# Patient Record
Sex: FEMALE | Race: BLACK OR AFRICAN AMERICAN | URBAN - METROPOLITAN AREA
[De-identification: names, ages, dates, MRNs, and addresses within clinical notes are randomized per-mention and may not be internally consistent; named-entity substitution may affect disease eponyms.]

---

## 2017-05-08 ENCOUNTER — HOSPITAL ENCOUNTER (OUTPATIENT)
Dept: MAMMOGRAPHY | Facility: CLINIC | Age: 42
Discharge: HOME | End: 2017-05-08
Attending: OBSTETRICS & GYNECOLOGY | Admitting: OBSTETRICS & GYNECOLOGY
Payer: COMMERCIAL

## 2017-05-08 DIAGNOSIS — Z12.31 VISIT FOR SCREENING MAMMOGRAM: ICD-10-CM

## 2017-05-08 PROCEDURE — G0202 SCR MAMMO BI INCL CAD: HCPCS

## 2020-11-17 ENCOUNTER — TRANSFERRED RECORDS (OUTPATIENT)
Dept: HEALTH INFORMATION MANAGEMENT | Facility: CLINIC | Age: 45
End: 2020-11-17

## 2020-12-23 ENCOUNTER — TRANSFERRED RECORDS (OUTPATIENT)
Dept: HEALTH INFORMATION MANAGEMENT | Facility: CLINIC | Age: 45
End: 2020-12-23

## 2020-12-29 ENCOUNTER — MEDICAL CORRESPONDENCE (OUTPATIENT)
Dept: HEALTH INFORMATION MANAGEMENT | Facility: CLINIC | Age: 45
End: 2020-12-29

## 2020-12-29 ENCOUNTER — TRANSCRIBE ORDERS (OUTPATIENT)
Dept: OTHER | Age: 45
End: 2020-12-29

## 2020-12-29 DIAGNOSIS — E11.8 TYPE 2 DIABETES MELLITUS WITH COMPLICATION, WITH LONG-TERM CURRENT USE OF INSULIN (H): ICD-10-CM

## 2020-12-29 DIAGNOSIS — Z79.4 TYPE 2 DIABETES MELLITUS WITH COMPLICATION, WITH LONG-TERM CURRENT USE OF INSULIN (H): ICD-10-CM

## 2020-12-29 DIAGNOSIS — A17.0 TB MENINGITIS: ICD-10-CM

## 2020-12-29 DIAGNOSIS — H57.13 EYE PAIN, BILATERAL: Primary | ICD-10-CM

## 2021-01-06 ENCOUNTER — TRANSFERRED RECORDS (OUTPATIENT)
Dept: HEALTH INFORMATION MANAGEMENT | Facility: CLINIC | Age: 46
End: 2021-01-06

## 2021-04-21 ENCOUNTER — TRANSCRIBE ORDERS (OUTPATIENT)
Dept: OTHER | Age: 46
End: 2021-04-21

## 2021-04-21 DIAGNOSIS — N13.30 HYDRONEPHROSIS, UNSPECIFIED HYDRONEPHROSIS TYPE: Primary | ICD-10-CM

## 2021-04-26 NOTE — TELEPHONE ENCOUNTER
MEDICAL RECORDS REQUEST   [facility]  Urology Clinic  [address] [place] [address]  PHONE: [phone]  Fax: [phone]        FUTURE VISIT INFORMATION                                                   Manuela Osman, : 1975 scheduled for future visit at [facility]    APPOINTMENT INFORMATION:    Date: 21    Provider:  Ankur    Reason for Visit/Diagnosis: hydronephrosis    REFERRAL INFORMATION:    Referring provider:  Veronica Gonzales    Specialty:     Referring providers clinic:  [facility]    Clinic contact number:  [phone]    RECORDS REQUESTED FOR VISIT                                                     NOTES  STATUS/DETAILS   OFFICE NOTE from referring provider  yes   OFFICE NOTE from other specialist  no   DISCHARGE SUMMARY from hospital  no   DISCHARGE REPORT from the ER  no   OPERATIVE REPORT  no   MEDICATION LIST  yes   LABS     URINALYSIS (UA)  yes   URINE CYTOLOGY  no

## 2021-05-13 ENCOUNTER — PRE VISIT (OUTPATIENT)
Dept: UROLOGY | Facility: CLINIC | Age: 46
End: 2021-05-13

## 2021-05-13 NOTE — TELEPHONE ENCOUNTER
Hydronephrosis consult.  Outside images requested.  All other records available in [facility] and Care Everywhere.

## 2021-05-14 ENCOUNTER — VIRTUAL VISIT (OUTPATIENT)
Dept: UROLOGY | Facility: CLINIC | Age: 46
End: 2021-05-14
Payer: COMMERCIAL

## 2021-05-14 ENCOUNTER — PRE VISIT (OUTPATIENT)
Dept: UROLOGY | Facility: CLINIC | Age: 46
End: 2021-05-14

## 2021-05-14 DIAGNOSIS — N13.30 HYDRONEPHROSIS OF LEFT KIDNEY: Primary | ICD-10-CM

## 2021-05-14 DIAGNOSIS — Z87.898 HISTORY OF URINARY RETENTION: ICD-10-CM

## 2021-05-14 PROCEDURE — 99203 OFFICE O/P NEW LOW 30 MIN: CPT | Mod: 95 | Performed by: NURSE PRACTITIONER

## 2021-05-14 NOTE — PATIENT INSTRUCTIONS
UROLOGY CLINIC VISIT PATIENT INSTRUCTIONS    -We will pursue a Lasix renogram scan to evaluate for any evidence of obstruction near your left kidney.   -Please follow up for a nurse visit to complete a urinary flow study and post-void residual scan on the same day.  -Follow up with Dr. Bae to review results of the renogram.     If you have any issues, questions or concerns in the meantime, do not hesitate to contact us at [phone] or via HistoryFile.     It was a pleasure meeting with you today.  Thank you for allowing me and my team the privilege of caring for you today.  YOU are the reason we are here, and I truly hope we provided you with the excellent service you deserve.  Please let us know if there is anything else we can do for you so that we can be sure you are leaving completely satisfied with your care experience.    Kayleen Pascual, CNP

## 2021-05-14 NOTE — LETTER
5/14/2021       RE: Manuela Osman  [address]  [facility] [address]     Dear Colleague,    Thank you for referring your patient, Manuela Osman, to the [facility] UROLOGY CLINIC [place] at [facility]. Please see a copy of my visit note below.    Video Visit Technology for this patient: Patient in video visit waiting room.    Manuela is a 46 year old who is being evaluated via a billable video visit.      How would you like to obtain your AVS? Mail a copy  If the video visit is dropped, the invitation should be resent by: Text to cell phone: [phone]  Will anyone else be joining your video visit? No    Video Start Time: 12:38 PM  Video-Visit Details    Type of service:  Video Visit    Video End Time: 1:06 PM    Originating Location (pt. Location): Home    Distant Location (provider location):  [facility] UROLOGY [facility]     Platform used for Video Visit: Teo      Manuela Osman complains of   Chief Complaint   Patient presents with     Consult     hydronephrosis       Assessment/Plan:  46 year old female with a history of ureteral stricture, s/p brief stenting in 6413-6559, now with imaging continuing to show hydronephrosis on her left side, which is not surprising. Her renal function has been noted to be severely reduced on the left side, down to ~10% on her last renogram a few years ago. However, she c/o left flank pain that has been worsening. As it has been a few years since her last set of imaging, will update a renogram to get a new baseline. Will refer to Dr. Bae for review of this imaging and determination of next steps / appropriateness for intervention, given her pain.    Regarding her more recent history of urinary retention requiring Webb, with lack of urologic follow up, recommend she come in for a nurse visit in the clinic to complete a uroflow/PVR to ensure that she is emptying her bladder well, as retention  "could worsen her hydro and flank pain.    Kayleen Pascual, CNP  Department of Urology      Subjective:   46 year old female with a history of DM2, HLD, ureteral stricture and TB meningitis who presents for evaluation of hydronephrosis. History is obtained from the patient with the aid of a professional [facility]  and supplemented by chart review. Patient is somewhat of a difficult historian.    She has a history of left-sided hydronephrosis found on ultrasound in 2017, and was referred to outside urologist. Records from her [place] [facility] visits not available today for personal review--will request. She then had a stent placed in 10/2017 for ureteral stricture. Unfortunately, she developed left pyelonephritis and urosepsis with this stent in place, and was hospitalized for IV abx and stent exchange. Renogram was then done in 09/2018, which appeared to show severely decreased function on the left with now blood blow and excretion, and split function showing 90% function on the right. Therefore, no further stenting or workup was planned by her urologist.     More recently, an ultrasound was obtained 5/7/21 again showing left-sided hydronepephrosis with cortical thinning. Today, she acknowledges some left flank/upper back pain. This has been present for \"some time now\" and seems to be getting worse.     Of note, she was also hospitalized in 11/202 with COVID, with fever and a stiff neck. 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 During this hospitalization, she had DM and urinary retention, which required a Webb catheter. Failed multiple voiding trials at rehab facility, and ended up going home with the catheter under care of her family. This was eventually removed a few months later, but she does not know who took it out and has had no urology follow up. She feels like she has been voiding fine.     Objective:     Exam:   Patient is a 46 year old female   General Appearance: Well groomed, " hygenic  Respiratory: No cough, no respiratory distress or labored breathing  Musculoskeletal: Grossly normal with no gross deficits  Skin: No discoloration or apparent rashes  Neurologic: No tremors  Psychiatric: Alert and oriented  Further examination is deferred due to the nature of our visit.

## 2021-05-14 NOTE — NURSING NOTE
"Chief Complaint   Patient presents with     Consult     hydronephrosis       Allergies   Allergen Reactions     Lisinopril Cough and Itching     Other reaction(s): Other (see comments)     Loratadine Itching     Other reaction(s): Other (see comments), Runny Nose     No Clinical Screening - See Comments      Other reaction(s): Unknown  \"allergy to something given during surgery for \"      Perfume Other (See Comments)     Sneezing and congestion  Other reaction(s): Other - Describe In Comment Field  Sneezing and congestion         Current Outpatient Medications   Medication Sig Dispense Refill     UNKNOWN TO PATIENT Patient does not know any of the medications she is taking.         Social History     Tobacco Use     Smoking status: Never Smoker     Smokeless tobacco: Never Used   Substance Use Topics     Alcohol use: None     Drug use: None       Holly Andrade LPN  2021  12:16 PM  "

## 2021-05-14 NOTE — PROGRESS NOTES
Video Visit Technology for this patient: Patient in video visit waiting room.    Manuela is a 46 year old who is being evaluated via a billable video visit.      How would you like to obtain your AVS? Mail a copy  If the video visit is dropped, the invitation should be resent by: Text to cell phone: [phone]  Will anyone else be joining your video visit? No    Video Start Time: 12:38 PM  Video-Visit Details    Type of service:  Video Visit    Video End Time: 1:06 PM    Originating Location (pt. Location): Home    Distant Location (provider location):  [facility] UROLOGY CLINIC [place]     Platform used for Video Visit: Teo      Manuela Osman complains of   Chief Complaint   Patient presents with     Consult     hydronephrosis       Assessment/Plan:  46 year old female with a history of ureteral stricture, s/p brief stenting in 5985-1080, now with imaging continuing to show hydronephrosis on her left side, which is not surprising. Her renal function has been noted to be severely reduced on the left side, down to ~10% on her last renogram a few years ago. However, she c/o left flank pain that has been worsening. As it has been a few years since her last set of imaging, will update a renogram to get a new baseline. Will refer to Dr. Bae for review of this imaging and determination of next steps / appropriateness for intervention, given her pain.    Regarding her more recent history of urinary retention requiring Webb, with lack of urologic follow up, recommend she come in for a nurse visit in the clinic to complete a uroflow/PVR to ensure that she is emptying her bladder well, as retention could worsen her hydro and flank pain.    Kayleen Pascual, CNP  Department of Urology      Subjective:   46 year old female with a history of DM2, HLD, ureteral stricture and TB meningitis who presents for evaluation of hydronephrosis. History is obtained from the patient with the aid of a professional [facility]  " and supplemented by chart review. Patient is somewhat of a difficult historian.    She has a history of left-sided hydronephrosis found on ultrasound in 2017, and was referred to outside urologist. Records from her [place] [facility] visits not available today for personal review--will request. She then had a stent placed in 10/2017 for ureteral stricture. Unfortunately, she developed left pyelonephritis and urosepsis with this stent in place, and was hospitalized for IV abx and stent exchange. Renogram was then done in 09/2018, which appeared to show severely decreased function on the left with now blood blow and excretion, and split function showing 90% function on the right. Therefore, no further stenting or workup was planned by her urologist.     More recently, an ultrasound was obtained 5/7/21 again showing left-sided hydronepephrosis with cortical thinning. Today, she acknowledges some left flank/upper back pain. This has been present for \"some time now\" and seems to be getting worse.     Of note, she was also hospitalized in 11/202 with COVID, with fever and a stiff neck. 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 During this hospitalization, she had DM and urinary retention, which required a Webb catheter. Failed multiple voiding trials at rehab facility, and ended up going home with the catheter under care of her family. This was eventually removed a few months later, but she does not know who took it out and has had no urology follow up. She feels like she has been voiding fine.       Objective:     Exam:   Patient is a 46 year old female   General Appearance: Well groomed, hygenic  Respiratory: No cough, no respiratory distress or labored breathing  Musculoskeletal: Grossly normal with no gross deficits  Skin: No discoloration or apparent rashes  Neurologic: No tremors  Psychiatric: Alert and oriented  Further examination is deferred due to the nature of our visit.               "

## 2021-05-17 ENCOUNTER — TELEPHONE (OUTPATIENT)
Dept: UROLOGY | Facility: CLINIC | Age: 46
End: 2021-05-17

## 2021-05-17 NOTE — TELEPHONE ENCOUNTER
Checkout note from 5/14 w/ Kayleen Pascual    -We will pursue a Lasix renogram scan to evaluate for any evidence of obstruction near your left kidney.   -Please follow up for a nurse visit to complete a urinary flow study and post-void residual scan on the same day.  -Follow up with Dr. Bae to review results of the renogram.

## 2021-05-27 ENCOUNTER — APPOINTMENT (OUTPATIENT)
Dept: INTERPRETER SERVICES | Facility: CLINIC | Age: 46
End: 2021-05-27
Payer: COMMERCIAL

## 2021-06-04 ENCOUNTER — PRE VISIT (OUTPATIENT)
Dept: UROLOGY | Facility: CLINIC | Age: 46
End: 2021-06-04

## 2021-06-04 NOTE — TELEPHONE ENCOUNTER
Reason for visit: follow up     Relevant information: hx of hydronephrosis of left kidney    Records/imaging/labs/orders: in [facility]; scheduled for renogram 5/14/21    Pt called: no    At Rooming: normal

## 2022-07-18 ENCOUNTER — TRANSCRIBE ORDERS (OUTPATIENT)
Dept: OTHER | Age: 47
End: 2022-07-18

## 2022-07-18 DIAGNOSIS — R20.2 NUMBNESS AND TINGLING OF BOTH FEET: Primary | ICD-10-CM

## 2022-07-18 DIAGNOSIS — R20.0 NUMBNESS AND TINGLING OF BOTH FEET: Primary | ICD-10-CM

## 2022-08-31 ENCOUNTER — MEDICAL CORRESPONDENCE (OUTPATIENT)
Dept: HEALTH INFORMATION MANAGEMENT | Facility: CLINIC | Age: 47
End: 2022-08-31

## 2022-09-01 NOTE — TELEPHONE ENCOUNTER
Action 8/31/22 MV 7.40pm   Action Taken Imaging request faxed to [facility]     12/19/2022-Request for images faxed to [facility]-MR @ 814am     12/20/2022-[facility] Images now in PACS-MR @ 512am    FUTURE VISIT INFORMATION      FUTURE VISIT INFORMATION:    Date: 12/22/22    Time: 2.15pm    Location: [facility]  REFERRAL INFORMATION:    Referring provider:  Nay WILLOUGHBY    Referring providers clinic:  [facility]    Reason for visit/diagnosis  Numbness & tingling in feet    RECORDS REQUESTED FROM:       Clinic name Comments Records Status Imaging Status   [facility] Nay Odom PA:  8/31/22 Care Everywhere No Images-MR    [facility] Dr Lewis Fernandez:  3/5/21 Care Everywhere No Images-MR    [facility] ED 12/16/20-12/21/20    Imaging:  MRI Lumbar Spine 6/23/21  MRI Thoracic Spine 6/23/21  MRI Lumbar Spine 3/19/21  MRI Cervical Spine 11/8/20  MRI Thoracic Spine 11/8/20  MRI Head 11/8/20  CT Head 11/6/20    Lumbar Puncture 11/6/20 Care Everywhere Requested-MR

## 2022-09-02 ENCOUNTER — TRANSCRIBE ORDERS (OUTPATIENT)
Dept: OTHER | Age: 47
End: 2022-09-02

## 2022-09-02 DIAGNOSIS — Z79.4 TYPE 2 DIABETES MELLITUS WITH DIABETIC POLYNEUROPATHY, WITH LONG-TERM CURRENT USE OF INSULIN (H): ICD-10-CM

## 2022-09-02 DIAGNOSIS — Z86.11: ICD-10-CM

## 2022-09-02 DIAGNOSIS — M54.41 BILATERAL LOW BACK PAIN WITH BILATERAL SCIATICA, UNSPECIFIED CHRONICITY: ICD-10-CM

## 2022-09-02 DIAGNOSIS — R29.898 WEAKNESS OF BOTH LOWER EXTREMITIES: Primary | ICD-10-CM

## 2022-09-02 DIAGNOSIS — E11.42 TYPE 2 DIABETES MELLITUS WITH DIABETIC POLYNEUROPATHY, WITH LONG-TERM CURRENT USE OF INSULIN (H): ICD-10-CM

## 2022-09-02 DIAGNOSIS — R52 GENERALIZED BODY ACHES: ICD-10-CM

## 2022-09-02 DIAGNOSIS — M54.42 BILATERAL LOW BACK PAIN WITH BILATERAL SCIATICA, UNSPECIFIED CHRONICITY: ICD-10-CM

## 2022-12-22 ENCOUNTER — VIRTUAL VISIT (OUTPATIENT)
Dept: NEUROLOGY | Facility: CLINIC | Age: 47
End: 2022-12-22
Attending: PHYSICIAN ASSISTANT
Payer: COMMERCIAL

## 2022-12-22 ENCOUNTER — PRE VISIT (OUTPATIENT)
Dept: NEUROLOGY | Facility: CLINIC | Age: 47
End: 2022-12-22

## 2022-12-22 DIAGNOSIS — G62.9 NEUROPATHY: Primary | ICD-10-CM

## 2022-12-22 PROCEDURE — 99207 PR NO CHARGE LOS: CPT | Performed by: STUDENT IN AN ORGANIZED HEALTH CARE EDUCATION/TRAINING PROGRAM

## 2022-12-22 RX ORDER — METFORMIN HCL 500 MG
500 TABLET, EXTENDED RELEASE 24 HR ORAL
COMMUNITY
Start: 2022-12-12

## 2022-12-22 RX ORDER — FLUTICASONE PROPIONATE 50 MCG
1 SPRAY, SUSPENSION (ML) NASAL PRN
COMMUNITY
Start: 2022-12-07

## 2022-12-22 RX ORDER — ATORVASTATIN CALCIUM 20 MG/1
40 TABLET, FILM COATED ORAL EVERY MORNING
COMMUNITY
Start: 2022-11-26

## 2022-12-22 RX ORDER — FLUCONAZOLE 150 MG/1
TABLET ORAL
COMMUNITY
Start: 2022-08-31 | End: 2024-08-21

## 2022-12-22 RX ORDER — INSULIN DEGLUDEC 100 U/ML
40 INJECTION, SOLUTION SUBCUTANEOUS
COMMUNITY
Start: 2022-12-19

## 2022-12-22 RX ORDER — FAMOTIDINE 20 MG/1
20 TABLET, FILM COATED ORAL
COMMUNITY
Start: 2022-12-06

## 2022-12-22 RX ORDER — ENOXAPARIN SODIUM 100 MG/ML
INJECTION SUBCUTANEOUS
COMMUNITY
End: 2024-08-21

## 2022-12-22 RX ORDER — CALCIUM CARBONATE/VITAMIN D3 500 MG-10
1 TABLET ORAL EVERY MORNING
COMMUNITY

## 2022-12-22 RX ORDER — LOSARTAN POTASSIUM 25 MG/1
25 TABLET ORAL AT BEDTIME
COMMUNITY
Start: 2022-11-21

## 2022-12-22 RX ORDER — BENZONATATE 100 MG/1
CAPSULE ORAL
COMMUNITY
Start: 2022-05-12 | End: 2024-08-21

## 2022-12-22 RX ORDER — GABAPENTIN 300 MG/1
400 CAPSULE ORAL 3 TIMES DAILY
COMMUNITY
Start: 2022-10-03

## 2022-12-22 RX ORDER — ACETAMINOPHEN 500 MG/1
TABLET, COATED ORAL
COMMUNITY
Start: 2022-11-21

## 2022-12-22 RX ORDER — DAPAGLIFLOZIN 10 MG/1
10 TABLET, FILM COATED ORAL AT BEDTIME
COMMUNITY
Start: 2022-12-21 | End: 2024-08-21

## 2022-12-22 RX ORDER — INSULIN ASPART 100 [IU]/ML
8 INJECTION, SOLUTION INTRAVENOUS; SUBCUTANEOUS 2 TIMES DAILY WITH MEALS
COMMUNITY
Start: 2022-12-19

## 2022-12-22 RX ORDER — DIPHENHYDRAMINE HCL 25 MG
CAPSULE ORAL
COMMUNITY
Start: 2022-12-06 | End: 2024-08-21

## 2022-12-22 NOTE — LETTER
2022       RE: Manuela Osman  [address]  [facility] [address]     Dear Colleague,    Thank you for referring your patient, Manuela Osman, to the [facility] NEUROLOGY CLINIC [place] at [facility]. Please see a copy of my visit note below.    DEPARTMENT OF NEUROLOGY  Referral for: numbness and tingling  Patient Name:  Manuela Osman  MRN:  [id number]    :  1975  Date of Clinic Visit:  2022  Primary Care Provider:  [facility]  Referring Provider: self    ASSESSMENT AND PLAN:  Manuela Osman is a 47 year old female with PMH significant for HTN, T2DM, TB meningitis in 2020 who presents to the neurology clinic for evaluation of bilateral lower extremity numbness, tingling, and pain. Has had MRI lumbar and thoracic spine x 2 and EMG prior to visit. Unfortunately very limited neurologic exam obtained due to virtual visit, and in this patient would be very helpful. There are components to history that sound suspicion for a lumbar radiculopathy, but also a peripheral neuropathy. A lumbar radiculopathy is supported by low back pain and symptoms traveling down buttock and lower extremities, however symptoms largely unchanged from lumbar MRI completed in 2021 without evidence of nerve impingement at that time. Also reports of intermittent sensation of symptoms starting at toes and moving upward to knees, with numbness and tingling. This in association with most recent Hgb A1c of 9.1 suggest possible component of a peripheral neuropathy. Again, a physical exam would be helpful in further evaluating this. EMG would also be beneficial in further localizing symptoms. Patient had one completed in 2021 through [facility], but results unavailable to me at this time. Will recommend repeating this study, as well as MRI spine to evaluate etiology of symptoms.     Plan:  - NCS/EMG   - MRI cervical, thoracic and lumbar spine  with and without contrast (contrast to evaluate for enhancement)  - Patient to reach out with current gabapentin dose and can make adjustments from there if indicated  - Follow up after the above is completed (in person)    Patient was discussed with Dr. Hidalgo.    Leena Rosas MD  Neurology Resident, PGY-3  [facility] Department of Neurology    Outpatient General Neurology Staff  I have discussed the patient with the neurology resident on 12-21-22.  The patient declined to stay on the video call after resident completed her initial evaluation.      We will ask that she follow up in person.    Miguel Hidalgo MD      ___________________________________________    CHIEF COMPLAINT: numbness in bilateral lower extremities    HISTORY OF PRESENT ILLNESS:  Manuela Osman is a  47 year old female presenting with PMH signficant for HTN, T2DM, HLD, ureteral stricture, vitamin D deficiency, TB meningitis who is presenting to the neurology clinic as a referral for numbness and tingling in feet.    Visit conducted via video with  merged onto call.    In November of 2021 patient first noticed symptoms after she was in the hospital for meningitis (per chart review actually appears to be November 2020 was admitted for TB meningitis). She notes she was in the hospital for 3 months due to this illness and it was during this hospitalization that she developed symptoms. Symptoms are typically numbness and subjective swelling in feet. Also reports of significant pain in lower back described as a sharp pain with numbness and tingling. Does not think that the pain radiates down from her back into legs but entire area is affected. When she is going from a seated to a standing position will have to use assistive device to get up due to reported pain and weakness. She did physical therapy for the past 6 months without improvement, currently going 2 days a week. Also reports taking gabapentin 1 tablet a day (unsure  dose of tablet) but does not think it is helping either. It was helpful initially but no longer providing much benefit.     No bowel or bladder incontinence. Constipation for which she takes bowel medications. Urinary retention secondary to ureteral stricture that has required gar catheter in the past. No changes in vision or hearing. No dizziness, vertigo.     She does also think that there is some weakness in her hands, particularly when she is trying to open bottles she usually needs to ask for help. No numbness, tingling, or other sensory changes in upper extremities.     She reports having several MRIs and EMG for symptoms. They have been relatively stable without much increase in symptoms since these studies were completed with most recent studies in June of 2021. 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 She did have an EMG at [facility] in Summer of 2021 as well but cannot see records for this.She reports she was not told about results of study.      PHYSICAL EXAMINATION:  Vitals: virtual visit     General: no acute distress    Neurologic Exam:  Mental status: Patient is oriented to person, place and time and able to provide a detailed account of history of illness. Attention and fund of knowledge are normal. Speech is fluent.    Cranial nerves: Difficult to evaluate pupils. Visual fields are full with finger counting on screen. Extraocular movements are normal. Facial movements are symmetric. Hearing is normal to conversation. Palate rises symmetrically and there is no dysarthria. Tongue protrusion is normal.    Motor/Strength: Lifts arms anti-gravity without difficulty. Stands from a seated position without assistance of upper extremities.    Sensation: reported to be decreased to light touch in feet up to low back.     Reflexes: deferred    Coordination: Finger-to-nose symmetric  "and normal bilaterally. Rapid alternating movements are symmetric in the extremities.     Gait: stable      INVESTIGATIONS:   MRI lumbar spine w/wo contrast 21  Impression:   1. No significant change compared to MRI dated 2021.   2. At L5-S1 there is a central protrusion that indents the thecal sac without significant spinal canal stenosis. Moderate facet arthrosis.   3. At L4-5 there is mild disc and facet degeneration resulting in mild spinal canal narrowing and mild narrowing of the neural foramina inferior recesses.   4. At L3-4 there is a mild disc bulge with mild narrowing of the neural foramina inferior recesses.   5. No pathologic enhancement. No evidence of discitis or osteomyelitis.   6. Stable left hydronephrosis without hydroureter    MRI thoracic spine w/wo contrast 21  IMPRESSION:   1. Mild dextrocurvature of the thoracic spine. No acute osseous abnormality.   2. No significant spinal canal stenosis or neural foraminal narrowing.   3. Normal thoracic spinal cord. Complete resolution of prior leptomeningeal enhancement.   4. Similar left hydronephrosis without hydroureter.       REVIEW OF SYSTEMS: 12-point RoS negative except as per HPI.    ALLERGIES:  Allergies   Allergen Reactions     Lisinopril Cough and Itching     Other reaction(s): Other (see comments)     Loratadine Itching     Other reaction(s): Other (see comments), Runny Nose     No Clinical Screening - See Comments      Other reaction(s): Unknown  \"allergy to something given during surgery for \"      Perfume Other (See Comments)     Sneezing and congestion  Other reaction(s): Other - Describe In Comment Field  Sneezing and congestion       MEDICATIONS:  Current Outpatient Medications   Medication Sig Dispense Refill     UNKNOWN TO PATIENT Patient does not know any of the medications she is taking.       PAST MEDICAL HISTORY:  No past medical history on file.     PAST SURGICAL HISTORY:  Past Surgical History: "   Procedure Laterality Date      SECTION       SHOULDER SURGERY       SOCIAL HISTORY:  Social History     Socioeconomic History     Marital status:    Tobacco Use     Smoking status: Never     Smokeless tobacco: Never   Other Topics Concern     Parent/sibling w/ CABG, MI or angioplasty before 65F 55M? Not Asked   Social History Narrative     Not on file       FAMILY HISTORY:  No family history on file.          Sincerely,    Leena Rosas MD

## 2022-12-22 NOTE — PROGRESS NOTES
Manuela is a 47 year old who is being evaluated via a billable video visit.      How would you like to obtain your AVS? Mail a copy  If the video visit is dropped, the invitation should be resent by: Text to cell phone: [phone]  Will anyone else be joining your video visit? No        Video-Visit Details    Type of service:  Video Visit   Video Start Time: 2:10 pm  Video End Time:2:45 pm    Originating Location (pt. Location): Home    Distant Location (provider location):  On-site  Platform used for Video Visit: SeymourWell

## 2022-12-22 NOTE — PROGRESS NOTES
DEPARTMENT OF NEUROLOGY  Referral for: numbness and tingling  Patient Name:  Manuela Osman  MRN:  [id number]    :  1975  Date of Clinic Visit:  2022  Primary Care Provider:  [facility]  Referring Provider: self    ASSESSMENT AND PLAN:  Manuela Osman is a 47 year old female with PMH significant for HTN, T2DM, TB meningitis in 2020 who presents to the neurology clinic for evaluation of bilateral lower extremity numbness, tingling, and pain. Has had MRI lumbar and thoracic spine x 2 and EMG prior to visit. Unfortunately very limited neurologic exam obtained due to virtual visit, and in this patient would be very helpful. There are components to history that sound suspicion for a lumbar radiculopathy, but also a peripheral neuropathy. A lumbar radiculopathy is supported by low back pain and symptoms traveling down buttock and lower extremities, however symptoms largely unchanged from lumbar MRI completed in 2021 without evidence of nerve impingement at that time. Also reports of intermittent sensation of symptoms starting at toes and moving upward to knees, with numbness and tingling. This in association with most recent Hgb A1c of 9.1 suggest possible component of a peripheral neuropathy. Again, a physical exam would be helpful in further evaluating this. EMG would also be beneficial in further localizing symptoms. Patient had one completed in 2021 through [facility], but results unavailable to me at this time. Will recommend repeating this study, as well as MRI spine to evaluate etiology of symptoms.     Plan:  - NCS/EMG   - MRI cervical, thoracic and lumbar spine with and without contrast (contrast to evaluate for enhancement)  - Patient to reach out with current gabapentin dose and can make adjustments from there if indicated  - Follow up after the above is completed (in person)    Patient was discussed with Dr. Hidalgo.    Leena Rosas MD  Neurology Resident,  PGY-3  [facility] Department of Neurology    Outpatient General Neurology Staff  I have discussed the patient with the neurology resident on 12-21-22.  The patient declined to stay on the video call after resident completed her initial evaluation.      We will ask that she follow up in person.    Miguel Hidalgo MD      ___________________________________________    CHIEF COMPLAINT: numbness in bilateral lower extremities    HISTORY OF PRESENT ILLNESS:  Manuela Osman is a  47 year old female presenting with [facility] signficant for HTN, T2DM, HLD, ureteral stricture, vitamin D deficiency, TB meningitis who is presenting to the neurology clinic as a referral for numbness and tingling in feet.    Visit conducted via video with  merged onto call.    In November of 2021 patient first noticed symptoms after she was in the hospital for meningitis (per chart review actually appears to be November 2020 was admitted for TB meningitis). She notes she was in the hospital for 3 months due to this illness and it was during this hospitalization that she developed symptoms. Symptoms are typically numbness and subjective swelling in feet. Also reports of significant pain in lower back described as a sharp pain with numbness and tingling. Does not think that the pain radiates down from her back into legs but entire area is affected. When she is going from a seated to a standing position will have to use assistive device to get up due to reported pain and weakness. She did physical therapy for the past 6 months without improvement, currently going 2 days a week. Also reports taking gabapentin 1 tablet a day (unsure dose of tablet) but does not think it is helping either. It was helpful initially but no longer providing much benefit.     No bowel or bladder incontinence. Constipation for which she takes bowel medications. Urinary retention secondary to ureteral stricture that has required gar catheter in the past. No  changes in vision or hearing. No dizziness, vertigo.     She does also think that there is some weakness in her hands, particularly when she is trying to open bottles she usually needs to ask for help. No numbness, tingling, or other sensory changes in upper extremities.     She reports having several MRIs and EMG for symptoms. They have been relatively stable without much increase in symptoms since these studies were completed with most recent studies in June of 2021. 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 She did have an EMG at [facility] in Summer of 2021 as well but cannot see records for this.She reports she was not told about results of study.      PHYSICAL EXAMINATION:  Vitals: virtual visit     General: no acute distress    Neurologic Exam:  Mental status: Patient is oriented to person, place and time and able to provide a detailed account of history of illness. Attention and fund of knowledge are normal. Speech is fluent.    Cranial nerves: Difficult to evaluate pupils. Visual fields are full with finger counting on screen. Extraocular movements are normal. Facial movements are symmetric. Hearing is normal to conversation. Palate rises symmetrically and there is no dysarthria. Tongue protrusion is normal.    Motor/Strength: Lifts arms anti-gravity without difficulty. Stands from a seated position without assistance of upper extremities.    Sensation: reported to be decreased to light touch in feet up to low back.     Reflexes: deferred    Coordination: Finger-to-nose symmetric and normal bilaterally. Rapid alternating movements are symmetric in the extremities.     Gait: stable      INVESTIGATIONS:   MRI lumbar spine w/wo contrast 6/23/21  Impression:   1. No significant change compared to MRI dated 03/19/2021.   2. At L5-S1 there is a central protrusion that indents the thecal  "sac without significant spinal canal stenosis. Moderate facet arthrosis.   3. At L4-5 there is mild disc and facet degeneration resulting in mild spinal canal narrowing and mild narrowing of the neural foramina inferior recesses.   4. At L3-4 there is a mild disc bulge with mild narrowing of the neural foramina inferior recesses.   5. No pathologic enhancement. No evidence of discitis or osteomyelitis.   6. Stable left hydronephrosis without hydroureter    MRI thoracic spine w/wo contrast 21  IMPRESSION:   1. Mild dextrocurvature of the thoracic spine. No acute osseous abnormality.   2. No significant spinal canal stenosis or neural foraminal narrowing.   3. Normal thoracic spinal cord. Complete resolution of prior leptomeningeal enhancement.   4. Similar left hydronephrosis without hydroureter.       REVIEW OF SYSTEMS: 12-point RoS negative except as per HPI.    ALLERGIES:  Allergies   Allergen Reactions     Lisinopril Cough and Itching     Other reaction(s): Other (see comments)     Loratadine Itching     Other reaction(s): Other (see comments), Runny Nose     No Clinical Screening - See Comments      Other reaction(s): Unknown  \"allergy to something given during surgery for \"      Perfume Other (See Comments)     Sneezing and congestion  Other reaction(s): Other - Describe In Comment Field  Sneezing and congestion       MEDICATIONS:  Current Outpatient Medications   Medication Sig Dispense Refill     UNKNOWN TO PATIENT Patient does not know any of the medications she is taking.       PAST MEDICAL HISTORY:  No past medical history on file.     PAST SURGICAL HISTORY:  Past Surgical History:   Procedure Laterality Date      SECTION       SHOULDER SURGERY       SOCIAL HISTORY:  Social History     Socioeconomic History     Marital status:    Tobacco Use     Smoking status: Never     Smokeless tobacco: Never   Other Topics Concern     Parent/sibling w/ CABG, MI or angioplasty before 65F " 55M? Not Asked   Social History Narrative     Not on file       FAMILY HISTORY:  No family history on file.

## 2022-12-29 ENCOUNTER — OFFICE VISIT (OUTPATIENT)
Dept: PHYSICAL MEDICINE AND REHAB | Facility: CLINIC | Age: 47
End: 2022-12-29
Attending: PHYSICIAN ASSISTANT
Payer: COMMERCIAL

## 2022-12-29 VITALS — DIASTOLIC BLOOD PRESSURE: 85 MMHG | SYSTOLIC BLOOD PRESSURE: 138 MMHG | HEART RATE: 83 BPM | OXYGEN SATURATION: 100 %

## 2022-12-29 DIAGNOSIS — M53.3 DISORDER OF SACRUM: ICD-10-CM

## 2022-12-29 DIAGNOSIS — R51.9 CHRONIC INTRACTABLE HEADACHE, UNSPECIFIED HEADACHE TYPE: ICD-10-CM

## 2022-12-29 DIAGNOSIS — G89.29 OTHER CHRONIC PAIN: ICD-10-CM

## 2022-12-29 DIAGNOSIS — G89.29 CHRONIC INTRACTABLE HEADACHE, UNSPECIFIED HEADACHE TYPE: ICD-10-CM

## 2022-12-29 DIAGNOSIS — R52 GENERALIZED BODY ACHES: ICD-10-CM

## 2022-12-29 DIAGNOSIS — M47.812 CERVICAL SPONDYLOSIS WITHOUT MYELOPATHY: Primary | ICD-10-CM

## 2022-12-29 DIAGNOSIS — M54.42 BILATERAL LOW BACK PAIN WITH BILATERAL SCIATICA, UNSPECIFIED CHRONICITY: ICD-10-CM

## 2022-12-29 DIAGNOSIS — R29.898 WEAKNESS OF BOTH LOWER EXTREMITIES: ICD-10-CM

## 2022-12-29 DIAGNOSIS — M79.18 MYALGIA, OTHER SITE: ICD-10-CM

## 2022-12-29 DIAGNOSIS — Z86.11: ICD-10-CM

## 2022-12-29 DIAGNOSIS — M54.41 BILATERAL LOW BACK PAIN WITH BILATERAL SCIATICA, UNSPECIFIED CHRONICITY: ICD-10-CM

## 2022-12-29 DIAGNOSIS — M47.817 LUMBOSACRAL SPONDYLOSIS WITHOUT MYELOPATHY: ICD-10-CM

## 2022-12-29 DIAGNOSIS — Z79.4 TYPE 2 DIABETES MELLITUS WITH DIABETIC POLYNEUROPATHY, WITH LONG-TERM CURRENT USE OF INSULIN (H): ICD-10-CM

## 2022-12-29 DIAGNOSIS — E11.42 TYPE 2 DIABETES MELLITUS WITH DIABETIC POLYNEUROPATHY, WITH LONG-TERM CURRENT USE OF INSULIN (H): ICD-10-CM

## 2022-12-29 PROCEDURE — 99205 OFFICE O/P NEW HI 60 MIN: CPT | Performed by: PHYSICAL MEDICINE & REHABILITATION

## 2022-12-29 NOTE — PROGRESS NOTES
CC: I am seeing this patient for evaluation of chronic aches and pains at multiple locations.    She is a 47-year-old woman with history of hypertension and diabetes as well as tuberculous meningitis.  She was initially seen for this at [facility] in November 2020 when she presented with abdominal pain, dizziness, sore throat and constipation as well as weakness for 4 weeks prior to that.  She also had COVID at this time she subsequently had infectious disease evaluation and lumbar puncture and was diagnosed with bacterial meningitis.  With recurrence of fever and neck stiffness, MRI of the cervical and brain consistent with meningitis.  She was given treatment for tuberculosis meningitis.  LP was felt to be consistent with TB meningitis and she was treated with ethambutol, INH and rifampin.    She has been dealing with headaches, neck pain as well as low back hip and extremity pain.  She reports being in the hospital for several days followed by TCU and was eventually able to come home after 3 months.    Currently she has one of her daughters acting as a PCA and helping her with ADLs.  She walks with a front wheel walker.  She is able to do her own toileting and feed herself but requires assistance for other things.    On a scale of 0-10, she notes headaches and neck pain in the 8 out of 10 range, low back and hip pain in the 6-7 usually 7 currently and 8 at its worst.  She also has some difficulty sitting for long periods of time and also has some numbness and tingling.    She has been evaluated by neurology and has plans for follow-up MRI of the cervical, thoracic and lumbar spine.  EMG has also been ordered for better characterization.    She has worked in the past as a PCA but none since the illness.    MRI lumbar spine w/wo contrast 6/23/21  Impression:   1. No significant change compared to MRI dated 03/19/2021.   2. At L5-S1 there is a central protrusion that indents the thecal sac without  significant spinal canal stenosis. Moderate facet arthrosis.   3. At L4-5 there is mild disc and facet degeneration resulting in mild spinal canal narrowing and mild narrowing of the neural foramina inferior recesses.   4. At L3-4 there is a mild disc bulge with mild narrowing of the neural foramina inferior recesses.   5. No pathologic enhancement. No evidence of discitis or osteomyelitis.   6. Stable left hydronephrosis without hydroureter     MRI thoracic spine w/wo contrast 21  IMPRESSION:   1. Mild dextrocurvature of the thoracic spine. No acute osseous abnormality.   2. No significant spinal canal stenosis or neural foraminal narrowing.   3. Normal thoracic spinal cord. Complete resolution of prior leptomeningeal enhancement.   4. Similar left hydronephrosis without hydroureter.    PMH:  Hyperlipemia   Type 2 diabetes mellitus with complication, with long-term current use of insulin (HCC-CMS)   Abnormal vaginal bleeding   Early menopause   Vitamin D deficiency   Health care home, active care coordination       Past Surgical History:   Procedure Laterality Date      SECTION       SHOULDER SURGERY           Social History     Social History Narrative     Not on file     She reports that she has never smoked. She has never used smokeless tobacco. She reports that she does not drink alcohol and does not use drugs.      Allergies   Allergen Reactions     Lisinopril Cough and Itching     Perfume Other - Describe In Comment Field   Sneezing and congestion     Loratadine Itching        Current Outpatient Medications   Medication Sig Dispense Refill     atorvastatin (LIPITOR) 20 MG tablet        Calcium Carb-Cholecalciferol 500-10 MG-MCG TABS Calcium 500 With D 500 mg-10 mcg (400 unit) tablet       cholecalciferol (VITAMIN D3) 125 mcg (5000 units) capsule        enoxaparin ANTICOAGULANT (LOVENOX) 40 MG/0.4ML syringe enoxaparin 40 mg/0.4 mL subcutaneous syringe       famotidine (PEPCID) 20 MG tablet         FARXIGA 10 MG TABS tablet        fluticasone (FLONASE) 50 MCG/ACT nasal spray        gabapentin (NEURONTIN) 300 MG capsule        [facility] PAIN RELIEF EXTRA  MG tablet        ketotifen (ZADITOR) 0.025 % ophthalmic solution        losartan (COZAAR) 25 MG tablet        metFORMIN (GLUCOPHAGE XR) 500 MG 24 hr tablet        NOVOLOG FLEXPEN 100 UNIT/ML soln        omeprazole (PRILOSEC) 20 MG DR capsule        TRESIBA FLEXTOUCH 100 UNIT/ML pen        UNKNOWN TO PATIENT Patient does not know any of the medications she is taking.       benzonatate (TESSALON) 100 MG capsule  (Patient not taking: Reported on 12/29/2022)       fluconazole (DIFLUCAN) 150 MG tablet  (Patient not taking: Reported on 12/29/2022)       HYPROMELLOSE-DEXTRAN 0.3-0.1% opthalmic solution  (Patient not taking: Reported on 12/29/2022)       /85 (BP Location: Left arm, Patient Position: Sitting, Cuff Size: Adult Large)   Pulse 83   SpO2 100%       On examination, patient is alert and cooperative and appears to be no acute distress.  Vitals are stable.  She is afebrile she was achy able to note her blood sugar was 101.    HEENT is unremarkable.  Extraocular movements are intact.  Face is symmetric.  Tongue is midline.  Neck is supple.  She is able to move her upper extremities functionally.She is able to move her lower extremities.  She is able to stand.  Romberg is negative.  She is able to walk with her walker.  She does tend to turn her left foot in with walking.    Neurologically, her cranial nerves are intact.  She is responding to light touch and vibration.  She is able to move her extremities against resistance.  Reflexes are decreased on the left and plantars are equivocal.    Musculoskeletal emanation revealed areas of tenderness over the cervical spine on the right side.  She has tenderness over the muscles of the neck and shoulder region on the right side.  She is also tender over the lower lumbar spine again on the right side.   There is involvement of the SI joints, trochanters and piriformis on either side.    No other tenderness was elicited elsewhere.    Impression: At this point this 47-year-old woman with a history of TB meningitis, history of COVID, prolonged hospitalization, followed by ongoing complaints of headache, neck pain.  She gives history typical of migraine with right-sided neck pain with sensitivity to light and sound.  She denies any emesis or aura.  She finds the lower back and the pelvic region more painful than the neck at this point.  I encouraged her to complete her MRIs.  Anticipate her pain is musculoskeletal coming from the facets in the neck and lower back as well as the joints in the SIs trochanteric bursa and piriformis.  I would recommend physical therapy once a week for 4 weeks after her work-up is complete.  I am also recommending treatment of her lower back with injection therapy which will include SI joint, piriformis and trochanteric bursal injections.  This was reviewed at length with the patient and her 2 daughters.    60 minutes spent for this visit, greater than 50% was for counseling on above-mentioned issues.    Donte Gayle MD

## 2022-12-29 NOTE — LETTER
12/29/2022       RE: Manuela Osman  [address]  [place] [id number]     Dear Colleague,    Thank you for referring your patient, Manuela Osman, to the [facility] [place] [facility]. Please see a copy of my visit note below.    CC: I am seeing this patient for evaluation of chronic aches and pains at multiple locations.    She is a 47-year-old woman with history of hypertension and diabetes as well as tuberculous meningitis.  She was initially seen for this at [facility] in November 2020 when she presented with abdominal pain, dizziness, sore throat and constipation as well as weakness for 4 weeks prior to that.  She also had COVID at this time she subsequently had infectious disease evaluation and lumbar puncture and was diagnosed with bacterial meningitis.  With recurrence of fever and neck stiffness, MRI of the cervical and brain consistent with meningitis.  She was given treatment for tuberculosis meningitis.  LP was felt to be consistent with TB meningitis and she was treated with ethambutol, INH and rifampin.    She has been dealing with headaches, neck pain as well as low back hip and extremity pain.  She reports being in the hospital for several days followed by TCU and was eventually able to come home after 3 months.    Currently she has one of her daughters acting as a PCA and helping her with ADLs.  She walks with a front wheel walker.  She is able to do her own toileting and feed herself but requires assistance for other things.    On a scale of 0-10, she notes headaches and neck pain in the 8 out of 10 range, low back and hip pain in the 6-7 usually 7 currently and 8 at its worst.  She also has some difficulty sitting for long periods of time and also has some numbness and tingling.    She has been evaluated by neurology and has plans for follow-up MRI of the cervical,  thoracic and lumbar spine.  EMG has also been ordered for better characterization.    She has worked in the past as a PCA but none since the illness.    MRI lumbar spine w/wo contrast 21  Impression:   1. No significant change compared to MRI dated 2021.   2. At L5-S1 there is a central protrusion that indents the thecal sac without significant spinal canal stenosis. Moderate facet arthrosis.   3. At L4-5 there is mild disc and facet degeneration resulting in mild spinal canal narrowing and mild narrowing of the neural foramina inferior recesses.   4. At L3-4 there is a mild disc bulge with mild narrowing of the neural foramina inferior recesses.   5. No pathologic enhancement. No evidence of discitis or osteomyelitis.   6. Stable left hydronephrosis without hydroureter     MRI thoracic spine w/wo contrast 21  IMPRESSION:   1. Mild dextrocurvature of the thoracic spine. No acute osseous abnormality.   2. No significant spinal canal stenosis or neural foraminal narrowing.   3. Normal thoracic spinal cord. Complete resolution of prior leptomeningeal enhancement.   4. Similar left hydronephrosis without hydroureter.    PMH:  Hyperlipemia   Type 2 diabetes mellitus with complication, with long-term current use of insulin (HCC-CMS)   Abnormal vaginal bleeding   Early menopause   Vitamin D deficiency   Health care home, active care coordination       Past Surgical History:   Procedure Laterality Date      SECTION       SHOULDER SURGERY           Social History     Social History Narrative     Not on file     She reports that she has never smoked. She has never used smokeless tobacco. She reports that she does not drink alcohol and does not use drugs.      Allergies   Allergen Reactions     Lisinopril Cough and Itching     Perfume Other - Describe In Comment Field   Sneezing and congestion     Loratadine Itching        Current Outpatient Medications   Medication Sig Dispense Refill     atorvastatin  (LIPITOR) 20 MG tablet        Calcium Carb-Cholecalciferol 500-10 MG-MCG TABS Calcium 500 With D 500 mg-10 mcg (400 unit) tablet       cholecalciferol (VITAMIN D3) 125 mcg (5000 units) capsule        enoxaparin ANTICOAGULANT (LOVENOX) 40 MG/0.4ML syringe enoxaparin 40 mg/0.4 mL subcutaneous syringe       famotidine (PEPCID) 20 MG tablet        FARXIGA 10 MG TABS tablet        fluticasone (FLONASE) 50 MCG/ACT nasal spray        gabapentin (NEURONTIN) 300 MG capsule        [facility] PAIN RELIEF EXTRA  MG tablet        ketotifen (ZADITOR) 0.025 % ophthalmic solution        losartan (COZAAR) 25 MG tablet        metFORMIN (GLUCOPHAGE XR) 500 MG 24 hr tablet        NOVOLOG FLEXPEN 100 UNIT/ML soln        omeprazole (PRILOSEC) 20 MG DR capsule        TRESIBA FLEXTOUCH 100 UNIT/ML pen        UNKNOWN TO PATIENT Patient does not know any of the medications she is taking.       benzonatate (TESSALON) 100 MG capsule  (Patient not taking: Reported on 12/29/2022)       fluconazole (DIFLUCAN) 150 MG tablet  (Patient not taking: Reported on 12/29/2022)       HYPROMELLOSE-DEXTRAN 0.3-0.1% opthalmic solution  (Patient not taking: Reported on 12/29/2022)       /85 (BP Location: Left arm, Patient Position: Sitting, Cuff Size: Adult Large)   Pulse 83   SpO2 100%       On examination, patient is alert and cooperative and appears to be no acute distress.  Vitals are stable.  She is afebrile she was achy able to note her blood sugar was 101.    HEENT is unremarkable.  Extraocular movements are intact.  Face is symmetric.  Tongue is midline.  Neck is supple.  She is able to move her upper extremities functionally.She is able to move her lower extremities.  She is able to stand.  Romberg is negative.  She is able to walk with her walker.  She does tend to turn her left foot in with walking.    Neurologically, her cranial nerves are intact.  She is responding to light touch and vibration.  She is able to move her extremities  against resistance.  Reflexes are decreased on the left and plantars are equivocal.    Musculoskeletal emanation revealed areas of tenderness over the cervical spine on the right side.  She has tenderness over the muscles of the neck and shoulder region on the right side.  She is also tender over the lower lumbar spine again on the right side.  There is involvement of the SI joints, trochanters and piriformis on either side.    No other tenderness was elicited elsewhere.    Impression: At this point this 47-year-old woman with a history of TB meningitis, history of COVID, prolonged hospitalization, followed by ongoing complaints of headache, neck pain.  She gives history typical of migraine with right-sided neck pain with sensitivity to light and sound.  She denies any emesis or aura.  She finds the lower back and the pelvic region more painful than the neck at this point.  I encouraged her to complete her MRIs.  Anticipate her pain is musculoskeletal coming from the facets in the neck and lower back as well as the joints in the SIs trochanteric bursa and piriformis.  I would recommend physical therapy once a week for 4 weeks after her work-up is complete.  I am also recommending treatment of her lower back with injection therapy which will include SI joint, piriformis and trochanteric bursal injections.  This was reviewed at length with the patient and her 2 daughters.    60 minutes spent for this visit, greater than 50% was for counseling on above-mentioned issues.          Again, thank you for allowing me to participate in the care of your patient.      Sincerely,    Donte Gayle MD

## 2022-12-29 NOTE — NURSING NOTE
Chief Complaint   Patient presents with     Consult     Continuing care on back pain and leg pain bilateral     Brenda Mcneil CMA at 12:53 PM on 12/29/2022.

## 2023-01-10 ENCOUNTER — TRANSCRIBE ORDERS (OUTPATIENT)
Dept: OTHER | Age: 48
End: 2023-01-10

## 2023-01-10 DIAGNOSIS — H81.10 BENIGN PAROXYSMAL POSITIONAL VERTIGO, UNSPECIFIED LATERALITY: Primary | ICD-10-CM

## 2023-01-15 ENCOUNTER — HEALTH MAINTENANCE LETTER (OUTPATIENT)
Age: 48
End: 2023-01-15

## 2023-02-06 ENCOUNTER — THERAPY VISIT (OUTPATIENT)
Dept: PHYSICAL THERAPY | Facility: CLINIC | Age: 48
End: 2023-02-06
Attending: PHYSICIAN ASSISTANT
Payer: COMMERCIAL

## 2023-02-06 DIAGNOSIS — H81.10 BENIGN PAROXYSMAL POSITIONAL VERTIGO, UNSPECIFIED LATERALITY: ICD-10-CM

## 2023-02-06 PROCEDURE — 97161 PT EVAL LOW COMPLEX 20 MIN: CPT | Mod: GP | Performed by: PHYSICAL THERAPIST

## 2023-02-07 NOTE — PROGRESS NOTES
02/06/23 1200   Quick Adds   Quick Adds Vestibular Eval   Type of Visit Initial OP PT Evaluation       Present Yes   Language Bahamian   General Information   Start of Care Date 02/06/23   Referring Physician FARTUN Lemus   Orders Evaluate and Treat as Indicated   Order Date 01/10/23   Medical Diagnosis BPPV   Onset of illness/injury or Date of Surgery 01/10/23   Surgical/Medical history reviewed Yes   Pertinent history of current problem (include personal factors and/or comorbidities that impact the POC) Patient reports true vertigo (room spinning) along with pulsatile tinnitus that lasts 5-6 minutes at a time and occur 2 times a day. Symptoms provoked when laying down in bed. Patient reports overall sedentary lifestyle. Patient uses WW and is unable to stand without WW due to BLE neuropathy and back pain. Patient does report R sided hearing loss that also occured with the dizziness 2 months ago. PMH: HTN, DM 2, TB in 11/2020   Pertinent Visual History  Decreased vision- diabetic retinopathy   Prior level of function comment Increased activity 3.5 years ago (prior to TB)   Living environment Apartment/condo   Current Assistive Devices Front Wheeled Walker   Patient/Family Goals Statement To decrease dizziness   Fall Risk Screen   Fall screen completed by PT   Have you fallen 2 or more times in the past year? No   Have you fallen and had an injury in the past year? No   Is patient a fall risk? No   Cognitive Status Examination   Orientation orientation to person, place and time   Level of Consciousness alert   Follows Commands and Answers Questions 100% of the time;able to follow multistep instructions   Personal Safety and Judgment intact   Memory intact   Transfer Skills   Transfer Comments SPT with WW modified independent   Gait   Gait Comments Patient ambulates with walker modified independent-slow edyta, reduced stride length   Sensory Examination   Sensory Perception Comments BLE  numbness (known neuropathy)   Oculomotor Exam   Smooth Pursuit Normal   Saccades Normal   VOR Abnormal   VOR Comments retinal slip horizontal   Rapid Head Thrust Normal   Convergence Testing Abnormal   Convergence Testing Comments R eye abduction ~10cm   Infrared Goggle Exam or Frenzel Lense Exam   Vestibular Suppressant in Last 24 Hours? Yes   Exam completed with Infrared Goggles   Spontaneous Nystagmus Negative   Gaze Evoked Nystagmus Negative   Head Shake Horizontal Nystagmus Other   Head Shake Horizontal Nystagmus comments increased symptoms   Positional Testing comments Attempted multiple times   [place]-Hallpike (right) Negative   [place]-Hallpike (Left) Negative   HSCC Supine Roll Test (Right) Negative   HSCC Supine Roll Test (Left) Negative   Dynamic Visual Acuity (DVA)   DVA Comments deferred   Clinical Impression   Criteria for Skilled Therapeutic Interventions Met yes, treatment indicated   PT Diagnosis Dizziness   Influenced by the following impairments Symptoms, sensation, vision   Functional limitations due to impairments Decreased participation in daily activities   Clinical Presentation Evolving/Changing   Clinical Presentation Rationale personal factors, body systems involved   Clinical Decision Making (Complexity) Low complexity   Therapy Frequency 1 time/week   Predicted Duration of Therapy Intervention (days/wks) 2-3 weeks   Risk & Benefits of therapy have been explained Yes   Patient, Family & other staff in agreement with plan of care Yes   Clinical Impression Comments Patient is a 48 year old female who presents to outpatient physical therapy with reported vertigo and hearing loss that occurred 2 months ago.  Patient reports ongoing bouts of vertigo that last 5 to 6 minutes, occur 2 times a day (getting out of bed).  Patient has been taking meclizine consistently since doctors appointment and reports improvement in symptoms.  On today's exam, no findings of positional vertigo even with multiple  attempts, but patient has been taking meclizine which may be impacting symptoms/nystagmus.  Recommended patient stop taking meclizine 2 days prior to next appointment for further assessment.  Contacted referring provider due to onset of hearing loss and right-side.   GOALS   PT Eval Goals 1   Goal 1   Goal Identifier Dizziness   Goal Description Patient will deny dizziness with all position changes to improve safety   Target Date 05/06/23   Total Evaluation Time   PT Silvio, Low Complexity Minutes (94832) 35     Paige Brizuela PT, DPT  Physical Therapist  [facility] [address]  [address] [place] [address]  [email or web address]  Appointments: [phone]

## 2023-02-08 ENCOUNTER — TRANSCRIBE ORDERS (OUTPATIENT)
Dept: OTHER | Age: 48
End: 2023-02-08

## 2023-02-08 DIAGNOSIS — H91.90 DECREASED HEARING, UNSPECIFIED LATERALITY: Primary | ICD-10-CM

## 2023-02-22 DIAGNOSIS — E11.9 DIABETIC EYE EXAM (H): Primary | ICD-10-CM

## 2023-02-22 DIAGNOSIS — Z01.00 DIABETIC EYE EXAM (H): Primary | ICD-10-CM

## 2023-02-22 PROBLEM — N12 PYELONEPHRITIS: Status: ACTIVE | Noted: 2018-01-20

## 2023-02-22 PROBLEM — R52 PAIN: Status: ACTIVE | Noted: 2017-12-13

## 2023-02-22 PROBLEM — G93.40 ENCEPHALOPATHY: Status: ACTIVE | Noted: 2020-11-05

## 2023-02-22 PROBLEM — E78.5 DYSLIPIDEMIA: Status: ACTIVE | Noted: 2021-05-04

## 2023-02-22 PROBLEM — R53.81 DEBILITY: Status: ACTIVE | Noted: 2020-11-19

## 2023-02-22 PROBLEM — U07.1 COVID-19: Status: ACTIVE | Noted: 2020-11-06

## 2023-02-22 PROBLEM — A41.9 SEPSIS (H): Status: ACTIVE | Noted: 2018-01-20

## 2023-02-22 PROBLEM — N17.9 ACUTE KIDNEY INJURY (H): Status: ACTIVE | Noted: 2020-11-05

## 2023-02-22 PROBLEM — N13.30 HYDRONEPHROSIS: Status: ACTIVE | Noted: 2021-05-04

## 2023-02-22 PROBLEM — F43.89 OTHER REACTIONS TO SEVERE STRESS: Status: ACTIVE | Noted: 2023-02-22

## 2023-02-22 PROBLEM — R42 DIZZINESS: Status: ACTIVE | Noted: 2020-11-05

## 2023-02-22 PROBLEM — D64.9 ANEMIA: Status: ACTIVE | Noted: 2018-01-20

## 2023-02-22 PROBLEM — N93.9 ABNORMAL VAGINAL BLEEDING: Status: ACTIVE | Noted: 2020-02-02

## 2023-02-22 PROBLEM — A17.0: Status: ACTIVE | Noted: 2020-11-19

## 2023-02-22 PROBLEM — E87.1 HYPONATREMIA: Status: ACTIVE | Noted: 2020-11-05

## 2023-02-22 PROBLEM — R31.29 MICROSCOPIC HEMATURIA: Status: ACTIVE | Noted: 2017-08-10

## 2023-02-22 PROBLEM — R33.9 RETENTION OF URINE: Status: ACTIVE | Noted: 2020-11-05

## 2023-02-22 PROBLEM — I10 ESSENTIAL HYPERTENSION: Status: ACTIVE | Noted: 2021-05-04

## 2023-02-22 PROBLEM — E28.319 EARLY MENOPAUSE: Status: ACTIVE | Noted: 2020-03-08

## 2023-02-22 NOTE — PROGRESS NOTES
HPI:  Patient presents for a diabetic eye exam. She has type 2 diabetes mellitus and the last HA1 was 9.5 on 11/05/2020. Vision has gradually worsen in both eyes.       Pertinent Medical History:    Type 2 diabetes mellitus    Dizziness    Dyslipidemia    Encephalopathy 2020    Hyperlipidemia    Tuberculous meningitis 2020    Vertigo    Hypertension    Diabetic polyneuropathy.     Ocular History:    None    Eye Medications:    Zaditor both eyes.     Assessment and Plan:  1.   Mild Nonproliferative Diabetic Retinopathy. Diabetic Macular Edema, left eye.     The last HA1 was 9.5 on 11/05/2020.     Macular OCT 02/27/2023: Right eye: no dme; Left eye: dme    Goal is to keep HA1C under 7.0.     2.   Dry Eye Syndrome, both eyes.     Start preservative free artificial tears 4 times daily both eyes.     3.   Pterygium, both eyes.     Start preservative free artificial tears 4 times daily both eyes.     4.   Presbyopia, both eyes.     Can try OTC readers for now.     5.   Cataract, both eyes.     Not visually significant.     Recommend UV protection. Monitor.         Patient consented to a dilated eye exam:    Yes. Side effects discussed.    Medical History:  No past medical history on file.    Medications:  Current Outpatient Medications   Medication Sig Dispense Refill     atorvastatin (LIPITOR) 20 MG tablet        benzonatate (TESSALON) 100 MG capsule  (Patient not taking: Reported on 12/29/2022)       Calcium Carb-Cholecalciferol 500-10 MG-MCG TABS Calcium 500 With D 500 mg-10 mcg (400 unit) tablet       cholecalciferol (VITAMIN D3) 125 mcg (5000 units) capsule        enoxaparin ANTICOAGULANT (LOVENOX) 40 MG/0.4ML syringe enoxaparin 40 mg/0.4 mL subcutaneous syringe       famotidine (PEPCID) 20 MG tablet        FARXIGA 10 MG TABS tablet        fluconazole (DIFLUCAN) 150 MG tablet  (Patient not taking: Reported on 12/29/2022)       fluticasone (FLONASE) 50 MCG/ACT nasal spray        gabapentin (NEURONTIN) 300 MG capsule         [facility]  MG tablet        HYPROMELLOSE-DEXTRAN 0.3-0.1% opthalmic solution  (Patient not taking: Reported on 12/29/2022)       ketotifen (ZADITOR) 0.025 % ophthalmic solution        losartan (COZAAR) 25 MG tablet        metFORMIN (GLUCOPHAGE XR) 500 MG 24 hr tablet        NOVOLOG FLEXPEN 100 UNIT/ML soln        omeprazole (PRILOSEC) 20 MG DR capsule        TRESIBA FLEXTOUCH 100 UNIT/ML pen        UNKNOWN TO PATIENT Patient does not know any of the medications she is taking.     Complete documentation of historical and exam elements from today's encounter can be found in the full encounter summary report (not reduplicated in this progress note). I personally obtained the chief complaint(s) and history of present illness.  I confirmed and edited as necessary the review of systems, past medical/surgical history, family history, social history, and examination findings as documented by others; and I examined the patient myself. I personally reviewed the relevant tests, images, and reports as documented above. I formulated and edited as necessary the assessment and plan and discussed the findings and management plan with the patient and family. - Radha Cantu OD

## 2023-02-27 ENCOUNTER — OFFICE VISIT (OUTPATIENT)
Dept: OPHTHALMOLOGY | Facility: CLINIC | Age: 48
End: 2023-02-27
Attending: OPTOMETRIST
Payer: COMMERCIAL

## 2023-02-27 DIAGNOSIS — H04.123 DRY EYE SYNDROME OF BOTH EYES: Primary | ICD-10-CM

## 2023-02-27 DIAGNOSIS — E11.3291 MILD NONPROLIFERATIVE DIABETIC RETINOPATHY OF RIGHT EYE WITHOUT MACULAR EDEMA ASSOCIATED WITH TYPE 2 DIABETES MELLITUS (H): ICD-10-CM

## 2023-02-27 DIAGNOSIS — E11.3212 MILD NONPROLIFERATIVE DIABETIC RETINOPATHY OF LEFT EYE WITH MACULAR EDEMA ASSOCIATED WITH TYPE 2 DIABETES MELLITUS (H): ICD-10-CM

## 2023-02-27 DIAGNOSIS — H52.4 PRESBYOPIA OF BOTH EYES: ICD-10-CM

## 2023-02-27 DIAGNOSIS — H11.043 STATIONARY PERIPHERAL PTERYGIUM OF BOTH EYES: ICD-10-CM

## 2023-02-27 DIAGNOSIS — Z01.00 DIABETIC EYE EXAM (H): ICD-10-CM

## 2023-02-27 DIAGNOSIS — H25.13 NUCLEAR SENILE CATARACT OF BOTH EYES: ICD-10-CM

## 2023-02-27 DIAGNOSIS — E11.9 DIABETIC EYE EXAM (H): ICD-10-CM

## 2023-02-27 PROCEDURE — G0463 HOSPITAL OUTPT CLINIC VISIT: HCPCS | Mod: 25

## 2023-02-27 PROCEDURE — 92134 CPTRZ OPH DX IMG PST SGM RTA: CPT | Performed by: OPTOMETRIST

## 2023-02-27 PROCEDURE — 92004 COMPRE OPH EXAM NEW PT 1/>: CPT | Performed by: OPTOMETRIST

## 2023-02-27 PROCEDURE — 92015 DETERMINE REFRACTIVE STATE: CPT

## 2023-02-27 RX ORDER — CARBOXYMETHYLCELLULOSE SODIUM 5 MG/ML
1 SOLUTION/ DROPS OPHTHALMIC 4 TIMES DAILY
Qty: 400 EACH | Refills: 11 | Status: SHIPPED | OUTPATIENT
Start: 2023-02-27 | End: 2024-08-21

## 2023-02-27 ASSESSMENT — VISUAL ACUITY
OS_SC: 20/25
OD_SC: 20/25
METHOD: TUMBLING E 'S

## 2023-02-27 ASSESSMENT — REFRACTION_MANIFEST
OD_CYLINDER: +0.50
OD_AXIS: 165
OS_SPHERE: +0.25
OD_SPHERE: +0.25
OS_CYLINDER: SPHERE
OD_ADD: +2.00
OS_ADD: +2.00

## 2023-02-27 ASSESSMENT — CONF VISUAL FIELD
OD_NORMAL: 1
OD_INFERIOR_TEMPORAL_RESTRICTION: 0
OS_NORMAL: 1
METHOD: COUNTING FINGERS
OD_SUPERIOR_TEMPORAL_RESTRICTION: 0
OS_SUPERIOR_NASAL_RESTRICTION: 0
OD_INFERIOR_NASAL_RESTRICTION: 0
OS_INFERIOR_TEMPORAL_RESTRICTION: 0
OS_INFERIOR_NASAL_RESTRICTION: 0
OD_SUPERIOR_NASAL_RESTRICTION: 0
OS_SUPERIOR_TEMPORAL_RESTRICTION: 0

## 2023-02-27 ASSESSMENT — TONOMETRY
OD_IOP_MMHG: 14
IOP_METHOD: ICARE
OS_IOP_MMHG: 14

## 2023-02-27 ASSESSMENT — SLIT LAMP EXAM - LIDS
COMMENTS: NORMAL
COMMENTS: NORMAL

## 2023-02-27 ASSESSMENT — EXTERNAL EXAM - LEFT EYE: OS_EXAM: NORMAL

## 2023-02-27 ASSESSMENT — EXTERNAL EXAM - RIGHT EYE: OD_EXAM: NORMAL

## 2023-02-27 NOTE — NURSING NOTE
Chief Complaints and History of Present Illnesses   Patient presents with     Eye Exam For Diabetes     Chief Complaint(s) and History of Present Illness(es)     Eye Exam For Diabetes            Laterality: both eyes    Associated symptoms: eye pain, redness, flashes, floaters and itching    Treatments tried: eye drops          Comments    Manuela Osman is a 48 year old female who presents for a diabetic exam. Last eye exam was over 1 year ago. Since exam, vision has gradually worsened. Notes some redness and itching. Started on zaditor x1 week with no improvement. Hx of floaters and flashes - stable.     No results found for: A1C  Had a1c checked in Oct/Nov of 2022, but does not recall number      Sumit Dacosta COT 2:09 PM February 27, 2023

## 2023-02-28 DIAGNOSIS — E11.3212 MILD NONPROLIFERATIVE DIABETIC RETINOPATHY OF LEFT EYE WITH MACULAR EDEMA ASSOCIATED WITH TYPE 2 DIABETES MELLITUS (H): Primary | ICD-10-CM

## 2023-03-03 ENCOUNTER — OFFICE VISIT (OUTPATIENT)
Dept: NEUROLOGY | Facility: CLINIC | Age: 48
End: 2023-03-03
Attending: PSYCHIATRY & NEUROLOGY
Payer: COMMERCIAL

## 2023-03-03 DIAGNOSIS — R20.2 PARESTHESIA: Primary | ICD-10-CM

## 2023-03-03 PROCEDURE — 95885 MUSC TST DONE W/NERV TST LIM: CPT | Mod: RT | Performed by: PHYSICAL MEDICINE & REHABILITATION

## 2023-03-03 PROCEDURE — 95911 NRV CNDJ TEST 9-10 STUDIES: CPT | Performed by: PHYSICAL MEDICINE & REHABILITATION

## 2023-03-03 PROCEDURE — 95886 MUSC TEST DONE W/N TEST COMP: CPT | Mod: LT | Performed by: PHYSICAL MEDICINE & REHABILITATION

## 2023-03-03 NOTE — LETTER
3/3/2023       RE: Manuela Osman  [address]  [facility] [id number]     Dear Colleague,    Thank you for referring your patient, Manuela Osman, to the [facility] [place] at [facility]. Please see a copy of my visit note below.                          [facility]  Electrodiagnostic Laboratory                 Department of Neurology                                                                                                         Test Date:  3/3/2023    Patient: Manuela Osman : 1975 Physician: Yolanda Yu MD   Sex: Female AGE: 48 year Ref Phys: Miguel Hidalgo MD   ID#: [id number]   Technician: Ashley Fountain       Clinical Information:  Manuela Osman is a 47 yo female who presents with pain and paresthesia in bilateral lower extremities, worse on the left side. Query polyneuropathy.       Techniques:  Motor and sensory conduction studies were done with surface recording electrodes. EMG was done with a concentric needle electrode.       Results:  All nerve conduction studies were normal.   All examined muscles (as indicated in the following table) showed no evidence of electrical instability.        Interpretation:  Normal examination.    --There is no electrodiagnostic evidence of polyneuropathy. A small fiber neuropathy cannot be excluded.    --There is no electrodiagnostic evidence of left lower extremity radiculopathy.    ___________________________  Yolanda Yu MD        Nerve Conduction Studies  Motor Sites      Latency Amplitude Neg. Amp Diff Segment Distance Velocity Neg. Dur Neg Area Diff Temperature Comment   Site (ms) Norm (mV) Norm %  cm m/s Norm ms %  C    Left Fibular (EDB) Motor   Ankle 4.1  < 6.0 4.2  > 2.5  Ankle-EDB 8   3.7  32.1    Bel Fib Head 10.3 - 3.6 - -14.3 Bel Fib Head-Ankle 28 45  > 38 4.1 -7.2 32    Pop Fossa 12.5 - 3.4 - -5.6 Pop Fossa-Bel Fib Head 10.5 48  > 38 4.2 -2.6  32.1    Right Fibular (EDB) Motor   Ankle 3.8  < 6.0 5.0  > 2.5  Ankle-EDB 8   4.3  31.5    Bel Fib Head 10.5 - 3.9 - -22.0 Bel Fib Head-Ankle 30 45  > 38 4.3 -17.8 31.5    Pop Fossa 12.6 - 3.8 - -2.6 Pop Fossa-Bel Fib Head 9 43  > 38 4.6 0 31.4    Left Tibial (AHB) Motor   Ankle 4.2  < 6.5 5.4  > 5.0  Ankle-AHB 8   4.1  32.1    Knee 12.5 - 2.6 - -51.9 Knee-Ankle 35.5 43  > 38 6.1 -38.8 32    Right Tibial (AHB) Motor   Ankle 3.5  < 6.5 5.6  > 5.0  Ankle-AHB 8   4.7  31.2    Knee 12.6 - 2.4 - -57.1 Knee-Ankle 38 42  > 38 6.0 -34.4 31.3      Sensory Sites      Onset Lat Peak Lat Amp (O-P) Amp (P-P) Segment Distance Velocity Temperature Comment   Site ms ms  V Norm  V  cm m/s Norm  C    Left Superficial Fibular Sensory   14 cm-Ankle 2.9 3.6 7  > 3 11 14 cm-Ankle 12.5 43  > 38 32.3    Right Superficial Fibular Sensory   14 cm-Ankle 2.3 3.0 8  > 3 3 14 cm-Ankle 12.5 54  > 38 31.5    Left Sural Sensory   Calf-Lat Mall 2.7 3.5 12  > 5 15 Calf-Lat Mall 14 52  > 38 31.7    Right Sural Sensory   Calf-Lat Mall 2.5 3.1 13  > 5 16 Calf-Lat Mall 14 56  > 38 31.6      F Wave Studies     Min-F Max-F Dispersion Persistence Mean-F F-Norm L-R Mean-F L-R Mean-F Norm F/M Ratio F-M Lat (ms)   Left Tibial (Abd Hallucis)  32.1  C   51.09 52.03 0.94 100.00 51.56 <61 1.23 <5.7 2.37 34.84   Right Tibial (Abd Hallucis)  31.1  C   40.00 56.56 16.56 100.00 52.79 <61 1.23 <5.7 2.17 34.84       Electromyography     Side Muscle Ins Act Fibs/PSW Fasc HF Amp Dur Poly Recrt Int Pat   Right Tib Anterior Nml None Nml 0 Nml Nml 0 Nml Nml   Right Gastroc Nml None Nml 0 Nml Nml 0 Nml Nml   Right Vastus Lat Nml None Nml 0 Nml Nml 0 Nml Nml   Left Tib Anterior Nml None Nml 0 Nml Nml 0 Nml Nml   Left Gastroc Nml None Nml 0 Nml Nml 0 Nml Nml   Left Vastus Lat Nml None Nml 0 Nml Nml 0 Nml Nml   Left Iliopsoas Nml None Nml 0 Nml Nml 0 Nml Nml   Left Add Eric Nml None Nml 0 Nml Nml 0 Nml Nml         NCS Waveforms:    Motor                Sensory                                  Sincerely,    Yolanda Yu MD

## 2023-03-03 NOTE — PROGRESS NOTES
[facility]  Electrodiagnostic Laboratory                 Department of Neurology                                                                                                         Test Date:  3/3/2023    Patient: Manuela Osman : 1975 Physician: Yolanda Yu MD   Sex: Female AGE: 48 year Ref Phys: Miguel Hidalgo MD   ID#: [id number]   Technician: Ashley Fountain       Clinical Information:  Manuela Osman is a 49 yo female who presents with pain and paresthesia in bilateral lower extremities, worse on the left side. Query polyneuropathy.       Techniques:  Motor and sensory conduction studies were done with surface recording electrodes. EMG was done with a concentric needle electrode.       Results:  All nerve conduction studies were normal.   All examined muscles (as indicated in the following table) showed no evidence of electrical instability.        Interpretation:  Normal examination.    --There is no electrodiagnostic evidence of polyneuropathy. A small fiber neuropathy cannot be excluded.    --There is no electrodiagnostic evidence of left lower extremity radiculopathy.    ___________________________  Yolanda Yu MD        Nerve Conduction Studies  Motor Sites      Latency Amplitude Neg. Amp Diff Segment Distance Velocity Neg. Dur Neg Area Diff Temperature Comment   Site (ms) Norm (mV) Norm %  cm m/s Norm ms %  C    Left Fibular (EDB) Motor   Ankle 4.1  < 6.0 4.2  > 2.5  Ankle-EDB 8   3.7  32.1    Bel Fib Head 10.3 - 3.6 - -14.3 Bel Fib Head-Ankle 28 45  > 38 4.1 -7.2 32    Pop Fossa 12.5 - 3.4 - -5.6 Pop Fossa-Bel Fib Head 10.5 48  > 38 4.2 -2.6 32.1    Right Fibular (EDB) Motor   Ankle 3.8  < 6.0 5.0  > 2.5  Ankle-EDB 8   4.3  31.5    Bel Fib Head 10.5 - 3.9 - -22.0 Bel Fib Head-Ankle 30 45  > 38 4.3 -17.8 31.5    Pop Fossa 12.6 - 3.8 - -2.6 Pop Fossa-Bel Fib Head 9 43  > 38 4.6 0 31.4    Left Tibial (AHB) Motor   Ankle 4.2  < 6.5 5.4  > 5.0   Ankle-AHB 8   4.1  32.1    Knee 12.5 - 2.6 - -51.9 Knee-Ankle 35.5 43  > 38 6.1 -38.8 32    Right Tibial (AHB) Motor   Ankle 3.5  < 6.5 5.6  > 5.0  Ankle-AHB 8   4.7  31.2    Knee 12.6 - 2.4 - -57.1 Knee-Ankle 38 42  > 38 6.0 -34.4 31.3      Sensory Sites      Onset Lat Peak Lat Amp (O-P) Amp (P-P) Segment Distance Velocity Temperature Comment   Site ms ms  V Norm  V  cm m/s Norm  C    Left Superficial Fibular Sensory   14 cm-Ankle 2.9 3.6 7  > 3 11 14 cm-Ankle 12.5 43  > 38 32.3    Right Superficial Fibular Sensory   14 cm-Ankle 2.3 3.0 8  > 3 3 14 cm-Ankle 12.5 54  > 38 31.5    Left Sural Sensory   Calf-Lat Mall 2.7 3.5 12  > 5 15 Calf-Lat Mall 14 52  > 38 31.7    Right Sural Sensory   Calf-Lat Mall 2.5 3.1 13  > 5 16 Calf-Lat Mall 14 56  > 38 31.6      F Wave Studies     Min-F Max-F Dispersion Persistence Mean-F F-Norm L-R Mean-F L-R Mean-F Norm F/M Ratio F-M Lat (ms)   Left Tibial (Abd Hallucis)  32.1  C   51.09 52.03 0.94 100.00 51.56 <61 1.23 <5.7 2.37 34.84   Right Tibial (Abd Hallucis)  31.1  C   40.00 56.56 16.56 100.00 52.79 <61 1.23 <5.7 2.17 34.84       Electromyography     Side Muscle Ins Act Fibs/PSW Fasc HF Amp Dur Poly Recrt Int Pat   Right Tib Anterior Nml None Nml 0 Nml Nml 0 Nml Nml   Right Gastroc Nml None Nml 0 Nml Nml 0 Nml Nml   Right Vastus Lat Nml None Nml 0 Nml Nml 0 Nml Nml   Left Tib Anterior Nml None Nml 0 Nml Nml 0 Nml Nml   Left Gastroc Nml None Nml 0 Nml Nml 0 Nml Nml   Left Vastus Lat Nml None Nml 0 Nml Nml 0 Nml Nml   Left Iliopsoas Nml None Nml 0 Nml Nml 0 Nml Nml   Left Add Eric Nml None Nml 0 Nml Nml 0 Nml Nml         NCS Waveforms:    Motor                Sensory

## 2023-03-14 ENCOUNTER — OFFICE VISIT (OUTPATIENT)
Dept: OPHTHALMOLOGY | Facility: CLINIC | Age: 48
End: 2023-03-14
Attending: OPHTHALMOLOGY
Payer: COMMERCIAL

## 2023-03-14 ENCOUNTER — THERAPY VISIT (OUTPATIENT)
Dept: PHYSICAL THERAPY | Facility: CLINIC | Age: 48
End: 2023-03-14
Payer: COMMERCIAL

## 2023-03-14 DIAGNOSIS — H81.10 BENIGN PAROXYSMAL POSITIONAL VERTIGO, UNSPECIFIED LATERALITY: Primary | ICD-10-CM

## 2023-03-14 DIAGNOSIS — E11.3212 MILD NONPROLIFERATIVE DIABETIC RETINOPATHY OF LEFT EYE WITH MACULAR EDEMA ASSOCIATED WITH TYPE 2 DIABETES MELLITUS (H): ICD-10-CM

## 2023-03-14 PROCEDURE — 99207 FUNDUS PHOTOS OU (BOTH EYES): CPT | Mod: 26 | Performed by: OPHTHALMOLOGY

## 2023-03-14 PROCEDURE — 99204 OFFICE O/P NEW MOD 45 MIN: CPT | Mod: GC | Performed by: OPHTHALMOLOGY

## 2023-03-14 PROCEDURE — 92134 CPTRZ OPH DX IMG PST SGM RTA: CPT | Performed by: OPHTHALMOLOGY

## 2023-03-14 PROCEDURE — 95992 CANALITH REPOSITIONING PROC: CPT | Mod: GP | Performed by: PHYSICAL THERAPIST

## 2023-03-14 PROCEDURE — G0463 HOSPITAL OUTPT CLINIC VISIT: HCPCS | Performed by: OPHTHALMOLOGY

## 2023-03-14 PROCEDURE — G0463 HOSPITAL OUTPT CLINIC VISIT: HCPCS | Mod: 25 | Performed by: OPHTHALMOLOGY

## 2023-03-14 PROCEDURE — 92250 FUNDUS PHOTOGRAPHY W/I&R: CPT | Performed by: OPHTHALMOLOGY

## 2023-03-14 PROCEDURE — 97112 NEUROMUSCULAR REEDUCATION: CPT | Mod: GP | Performed by: PHYSICAL THERAPIST

## 2023-03-14 ASSESSMENT — SLIT LAMP EXAM - LIDS
COMMENTS: NORMAL
COMMENTS: NORMAL

## 2023-03-14 ASSESSMENT — VISUAL ACUITY
OS_SC: 20/25
OD_SC: 20/25
OD_SC+: +1
OS_SC+: -1
METHOD: TUMBLING E 'S

## 2023-03-14 ASSESSMENT — TONOMETRY
OD_IOP_MMHG: 14
IOP_METHOD: TONOPEN
OS_IOP_MMHG: 16

## 2023-03-14 ASSESSMENT — REFRACTION_WEARINGRX
OD_SPHERE: +0.25
OD_AXIS: 165
OS_SPHERE: +0.25
OS_ADD: +2.00
OS_CYLINDER: SPHERE
OD_ADD: +2.00
OD_CYLINDER: +0.50

## 2023-03-14 ASSESSMENT — EXTERNAL EXAM - RIGHT EYE: OD_EXAM: NORMAL

## 2023-03-14 ASSESSMENT — CONF VISUAL FIELD
OS_SUPERIOR_NASAL_RESTRICTION: 0
METHOD: COUNTING FINGERS
OS_NORMAL: 1
OD_SUPERIOR_TEMPORAL_RESTRICTION: 0
OS_INFERIOR_NASAL_RESTRICTION: 0
OS_INFERIOR_TEMPORAL_RESTRICTION: 0
OS_SUPERIOR_TEMPORAL_RESTRICTION: 0
OD_NORMAL: 1
OD_INFERIOR_NASAL_RESTRICTION: 0
OD_INFERIOR_TEMPORAL_RESTRICTION: 0
OD_SUPERIOR_NASAL_RESTRICTION: 0

## 2023-03-14 ASSESSMENT — EXTERNAL EXAM - LEFT EYE: OS_EXAM: NORMAL

## 2023-03-14 NOTE — NURSING NOTE
Chief Complaints and History of Present Illnesses   Patient presents with     Diabetic Macular Edema Evaluation     Chief Complaint(s) and History of Present Illness(es)     Diabetic Macular Edema Evaluation            Laterality: left eye          Comments    Pt here with  over the phone today. Vision is the same as last visit. No eye pain today.  No new flashes. Occasional floaters in LE that come and go.     DM2 BS: 116 this morning per pt.   A1C: 7.2 taken about 3-4 months ago per pt.  No results found for: A1C    CAPRICE Carranza March 14, 2023 7:46 AM

## 2023-03-14 NOTE — PROGRESS NOTES
CC -   NPDR    INTERVAL HISTORY - Initial visit with me.  Referred for NPDR OU     of HLD, diabetes, who presents as a referral from Dr. Govea for macular edema in the left eye.     Pt here with  over the phone today. Feels like she has blurrier vision in the left eye, No eye pain today. No new flashes. Occasional floaters in LE that come and go.     DM2 BS: 116 this morning per pt.   A1C: 7.2 taken about 3-4 months ago per pt.        [facility] -   Manuela Osman is a  48 year old year-old patient with history NPDR OU moderate with DMII        PAST OCULAR SURGERY  None    RETINAL IMAGING:  OCT 03/14/23  OD - small MAs with mild DME extrafoveal, PHF attached  OS - large MA with mild DME, PHF attached,       ASSESSMENT & PLAN    # Moderate NPDR OU with DM II and DME   - BP/BG control      # DME OU   - very mild      # syneresis OU   - advised S/Sx RD 3/2023      # NS OU   - mild      # history of allergic conjunctivitis and dry eye   - continue ketotifen and AT prn    return to clinic: 6  months, DFE OU, OCT OU    Aleah Merritt MD  PGY-4 Resident Physician  Department of Ophthalmology    ATTESTATION     Attending Physician Attestation:      Complete documentation of historical and exam elements from today's encounter can be found in the full encounter summary report (not reduplicated in this progress note).  I personally obtained the chief complaint(s) and history of present illness.  I confirmed and edited as necessary the review of systems, past medical/surgical history, family history, social history, and examination findings as documented by others; and I examined the patient myself.  I personally reviewed the relevant tests, images, and reports as documented above.  I personally reviewed the ophthalmic test(s) associated with this encounter, agree with the interpretation(s) as documented by the resident/fellow, and have edited the corresponding report(s) as necessary.   I formulated and edited as  necessary the assessment and plan and discussed the findings and management plan with the patient and family    Maricarmen Harris MD, PhD  , Vitreoretinal Surgery  Department of Ophthalmology  [facility]

## 2023-04-14 ENCOUNTER — MEDICAL CORRESPONDENCE (OUTPATIENT)
Dept: HEALTH INFORMATION MANAGEMENT | Facility: CLINIC | Age: 48
End: 2023-04-14
Payer: MEDICARE

## 2023-04-24 ENCOUNTER — OFFICE VISIT (OUTPATIENT)
Dept: AUDIOLOGY | Facility: CLINIC | Age: 48
End: 2023-04-24
Attending: NURSE PRACTITIONER
Payer: COMMERCIAL

## 2023-04-24 ENCOUNTER — THERAPY VISIT (OUTPATIENT)
Dept: PHYSICAL THERAPY | Facility: CLINIC | Age: 48
End: 2023-04-24
Payer: COMMERCIAL

## 2023-04-24 DIAGNOSIS — H91.90 DECREASED HEARING, UNSPECIFIED LATERALITY: ICD-10-CM

## 2023-04-24 DIAGNOSIS — H81.10 BENIGN PAROXYSMAL POSITIONAL VERTIGO, UNSPECIFIED LATERALITY: Primary | ICD-10-CM

## 2023-04-24 DIAGNOSIS — H93.11 TINNITUS OF RIGHT EAR: Primary | ICD-10-CM

## 2023-04-24 DIAGNOSIS — R42 DIZZINESS AND GIDDINESS: ICD-10-CM

## 2023-04-24 PROCEDURE — 97112 NEUROMUSCULAR REEDUCATION: CPT | Mod: GP | Performed by: PHYSICAL THERAPIST

## 2023-04-24 PROCEDURE — 92550 TYMPANOMETRY & REFLEX THRESH: CPT | Performed by: AUDIOLOGIST

## 2023-04-24 PROCEDURE — 92557 COMPREHENSIVE HEARING TEST: CPT | Performed by: AUDIOLOGIST

## 2023-04-24 PROCEDURE — 95992 CANALITH REPOSITIONING PROC: CPT | Mod: GP | Performed by: PHYSICAL THERAPIST

## 2023-04-24 NOTE — PROGRESS NOTES
AUDIOLOGY REPORT    SUBJECTIVE: Manuela Osman is a 48 year old female who was seen in the Audiology Clinic at [facility] and [facility] for audiologic evaluation, referred by Robert Webster MD. History is significant for vertigo, for which the patient has been seeing Vestibular Physical Therapy (PT). The patient reported right hearing loss a few months ago to Vanessa Brizuela PT, who recommended audiologic evaluation. Today, the patient reports occasional right tinnitus (pulsatile at times, ringing at other times). She denies bilateral pain, bilateral drainage, or a history of ear surgeries. The patient's daughter served as a [employer]  today (waiver on file).    OBJECTIVE:  Abuse Screening:  Do you feel unsafe at home or work/school? No  Do you feel threatened by someone? No  Does anyone try to keep you from having contact with others, or doing things outside of your home? No  Physical signs of abuse present? No     Otoscopic exam indicated ears are clear of cerumen bilaterally     Pure Tone Thresholds assessed using conventional audiometry with good reliability from 250-8000 Hz bilaterally using insert earphones and circumaural headphones     RIGHT:  Normal hearing    LEFT:    Normal hearing    Tympanogram:    RIGHT: Normal eardrum mobility    LEFT:   Normal eardrum mobility    Reflexes (reported by stimulus ear):    RIGHT: Ipsilateral is present at normal levels    RIGHT: Contralateral is present at normal levels    LEFT:   Ipsilateral is present at normal levels    LEFT:   Contralateral is present at normal levels    Speech Detection Threshold:    RIGHT: 15 dB HL    LEFT:   10 dB HL    Word Recognition Score (NOTE: The patient felt comfortable completing test in English):     RIGHT: 92% at 55 dB HL using easier PBK-50 recorded word list    LEFT:   96% at 55 dB HL using easier PBK-50 recorded word list    ASSESSMENT: Normal hearing bilaterally. Today s results were  discussed with the patient in detail.     PLAN: Repeat audiologic evaluation is recommended if changes are noted. Please call this clinic with questions regarding these results or recommendations.      Adan York, [facility]  Licensed Audiologist  [place] #[id number]      CC: Vanessa Brizuela PT

## 2023-04-29 ENCOUNTER — HEALTH MAINTENANCE LETTER (OUTPATIENT)
Age: 48
End: 2023-04-29

## 2023-05-02 ENCOUNTER — THERAPY VISIT (OUTPATIENT)
Dept: PHYSICAL THERAPY | Facility: CLINIC | Age: 48
End: 2023-05-02
Payer: COMMERCIAL

## 2023-05-02 DIAGNOSIS — H81.10 BENIGN PAROXYSMAL POSITIONAL VERTIGO, UNSPECIFIED LATERALITY: Primary | ICD-10-CM

## 2023-05-02 PROCEDURE — 95992 CANALITH REPOSITIONING PROC: CPT | Mod: GP | Performed by: PHYSICAL THERAPIST

## 2023-09-01 DIAGNOSIS — E11.3212 MILD NONPROLIFERATIVE DIABETIC RETINOPATHY OF LEFT EYE WITH MACULAR EDEMA ASSOCIATED WITH TYPE 2 DIABETES MELLITUS (H): Primary | ICD-10-CM

## 2023-09-15 ENCOUNTER — OFFICE VISIT (OUTPATIENT)
Dept: OPHTHALMOLOGY | Facility: CLINIC | Age: 48
End: 2023-09-15
Attending: OPHTHALMOLOGY
Payer: MEDICARE

## 2023-09-15 DIAGNOSIS — E11.3212 MILD NONPROLIFERATIVE DIABETIC RETINOPATHY OF LEFT EYE WITH MACULAR EDEMA ASSOCIATED WITH TYPE 2 DIABETES MELLITUS (H): ICD-10-CM

## 2023-09-15 PROCEDURE — G0463 HOSPITAL OUTPT CLINIC VISIT: HCPCS | Performed by: OPHTHALMOLOGY

## 2023-09-15 PROCEDURE — 92134 CPTRZ OPH DX IMG PST SGM RTA: CPT | Performed by: OPHTHALMOLOGY

## 2023-09-15 PROCEDURE — 99213 OFFICE O/P EST LOW 20 MIN: CPT | Mod: GC | Performed by: OPHTHALMOLOGY

## 2023-09-15 ASSESSMENT — SLIT LAMP EXAM - LIDS
COMMENTS: NORMAL
COMMENTS: NORMAL

## 2023-09-15 ASSESSMENT — CONF VISUAL FIELD
OD_INFERIOR_NASAL_RESTRICTION: 0
OD_SUPERIOR_NASAL_RESTRICTION: 0
OS_INFERIOR_TEMPORAL_RESTRICTION: 0
OD_SUPERIOR_TEMPORAL_RESTRICTION: 0
OS_NORMAL: 1
OS_SUPERIOR_TEMPORAL_RESTRICTION: 0
METHOD: COUNTING FINGERS
OS_SUPERIOR_NASAL_RESTRICTION: 0
OD_NORMAL: 1
OS_INFERIOR_NASAL_RESTRICTION: 0
OD_INFERIOR_TEMPORAL_RESTRICTION: 0

## 2023-09-15 ASSESSMENT — TONOMETRY
OD_IOP_MMHG: 14
IOP_METHOD: TONOPEN
OS_IOP_MMHG: 18

## 2023-09-15 ASSESSMENT — VISUAL ACUITY
OS_SC: 20/20-3
METHOD: SNELLEN - LINEAR
OD_SC: 20/20

## 2023-09-15 ASSESSMENT — EXTERNAL EXAM - RIGHT EYE: OD_EXAM: NORMAL

## 2023-09-15 ASSESSMENT — EXTERNAL EXAM - LEFT EYE: OS_EXAM: NORMAL

## 2023-09-15 NOTE — PROGRESS NOTES
CC -   NPDR    INTERVAL HISTORY - No new concerns or changes per pt, eyes have been good since LV   BG was high recently d/t unable to get meds d/t insurance    PMH -   Manuela Osman is a  48 year old year-old patient with history NPDR OU moderate with DMII     of HLD, diabetes,  referral from Dr. Govea for macular edema in the left eye.      PAST OCULAR SURGERY  None    RETINAL IMAGING:  OCT 03/14/23  OD - small MAs with mild DME extrafoveal, PHF attached  OS - large MA with mild DME, PHF attached, ->236      ASSESSMENT & PLAN    # Moderate NPDR OU with DM II and DME   - BP/BG control      # DME OU   - noted 3/2023   - very mild   - stable/better today      # syneresis OU   - advised S/Sx RD 3/2023      # NS OU   - mild   - patient requests MRx printed from 2/2023 with [facility]      # history of allergic conjunctivitis and dry eye   - continue ketotifen and AT prn    Return in about 6 months (around 3/15/2024) for Tech Instructions:, DFE OU, OCT OU.     Baldo Painter MD MPH  Vitreoretinal Fellow PGY-6  [facility]     ATTESTATION     Attending Physician Attestation:      Complete documentation of historical and exam elements from today's encounter can be found in the full encounter summary report (not reduplicated in this progress note).  I personally obtained the chief complaint(s) and history of present illness.  I confirmed and edited as necessary the review of systems, past medical/surgical history, family history, social history, and examination findings as documented by others; and I examined the patient myself.  I personally reviewed the relevant tests, images, and reports as documented above.  I personally reviewed the ophthalmic test(s) associated with this encounter, agree with the interpretation(s) as documented by the resident/fellow, and have edited the corresponding report(s) as necessary.   I formulated and edited as necessary the assessment and plan and discussed the findings and  management plan with the patient and family    Maricarmen Harris MD, PhD  , Vitreoretinal Surgery  Department of Ophthalmology  [facility]

## 2024-03-07 ENCOUNTER — TRANSCRIBE ORDERS (OUTPATIENT)
Dept: NEUROSURGERY | Facility: CLINIC | Age: 49
End: 2024-03-07
Payer: COMMERCIAL

## 2024-03-07 DIAGNOSIS — G93.9 BRAIN LESION: Primary | ICD-10-CM

## 2024-03-08 DIAGNOSIS — E11.3212 MILD NONPROLIFERATIVE DIABETIC RETINOPATHY OF LEFT EYE WITH MACULAR EDEMA ASSOCIATED WITH TYPE 2 DIABETES MELLITUS (H): Primary | ICD-10-CM

## 2024-03-11 ENCOUNTER — PRE VISIT (OUTPATIENT)
Dept: NEUROSURGERY | Facility: CLINIC | Age: 49
End: 2024-03-11
Payer: COMMERCIAL

## 2024-03-11 NOTE — TELEPHONE ENCOUNTER
Please obtain imaging and upload to PACS.       ----------------------------------------------------    Action 3/11/24 MV 6.43am   Action Taken Imaging request faxed to [facility]:  MRI Brain 3/4/24  MRI Head 11/8/20  CT Head 11/6/20     Action 3/11/24 MV 12.28pm   Action Taken Images resolved in PACS

## 2024-03-15 NOTE — TELEPHONE ENCOUNTER
RECORDS RECEIVED FROM: external   REASON FOR VISIT: Brain Lesion    PROVIDER: Dr. Franki Kwon   DATE OF APPT: 3/27/24   NOTES (FOR ALL VISITS) STATUS DETAILS   OFFICE NOTE from referring provider Care Everywhere Dr Veronica Gonzales @ [facility]:  3/7/24  2/22/24  12/19/23   DISCHARGE REPORT from the ER Care Everywhere [facility]:  2/19/24   MEDICATION LIST Care Everywhere    IMAGING  (FOR ALL VISITS)     MRI (HEAD, NECK, SPINE) PACS [facility]:  MRI Brain 3/4/24  MRI Head 11/8/20   CT (HEAD, NECK, SPINE) PACS [facility]:  CT Head 11/6/20

## 2024-03-19 ENCOUNTER — OFFICE VISIT (OUTPATIENT)
Dept: OPHTHALMOLOGY | Facility: CLINIC | Age: 49
End: 2024-03-19
Attending: OPHTHALMOLOGY
Payer: COMMERCIAL

## 2024-03-19 DIAGNOSIS — E11.3212 MILD NONPROLIFERATIVE DIABETIC RETINOPATHY OF LEFT EYE WITH MACULAR EDEMA ASSOCIATED WITH TYPE 2 DIABETES MELLITUS (H): ICD-10-CM

## 2024-03-19 PROCEDURE — 99214 OFFICE O/P EST MOD 30 MIN: CPT | Performed by: OPHTHALMOLOGY

## 2024-03-19 PROCEDURE — 92134 CPTRZ OPH DX IMG PST SGM RTA: CPT | Performed by: OPHTHALMOLOGY

## 2024-03-19 PROCEDURE — G0463 HOSPITAL OUTPT CLINIC VISIT: HCPCS | Performed by: OPHTHALMOLOGY

## 2024-03-19 ASSESSMENT — VISUAL ACUITY
OD_SC+: -2
OS_SC: 20/25
OS_SC+: -3
METHOD: SNELLEN - LINEAR
OD_SC: 20/20

## 2024-03-19 ASSESSMENT — EXTERNAL EXAM - LEFT EYE: OS_EXAM: NORMAL

## 2024-03-19 ASSESSMENT — SLIT LAMP EXAM - LIDS
COMMENTS: NORMAL
COMMENTS: NORMAL

## 2024-03-19 ASSESSMENT — TONOMETRY
OS_IOP_MMHG: 13
IOP_METHOD: TONOPEN
OD_IOP_MMHG: 10

## 2024-03-19 ASSESSMENT — EXTERNAL EXAM - RIGHT EYE: OD_EXAM: NORMAL

## 2024-03-19 NOTE — NURSING NOTE
"Chief Complaints and History of Present Illnesses   Patient presents with    Diabetic Retinopathy Follow Up     6 month follow up Moderate NPDR OU with DM II     Chief Complaint(s) and History of Present Illness(es)       Diabetic Retinopathy Follow Up              Associated symptoms: eye pain, flashes and floaters    Treatments tried: eye drops    Pain scale: 3/10    Comments: 6 month follow up Moderate NPDR OU with DM II              Comments    A [employer]  was used for this visit.   She tells me her vision has been fluctuating lately due to blood sugar.   She feels some pain(3/10) below RLL beginning last night.   She sees dark floaters and flashes 4-5 times per day each eye     DM2  LBS : 250 this AM  No results found for: \"A1C\"     Norman Devlin 8:28 AM March 19, 2024                    "

## 2024-03-19 NOTE — PROGRESS NOTES
CC -   NPDR    INTERVAL HISTORY - BG high lately, VA worse OU    PMH -   Manuela Osman is a  49 year old year-old patient with history NPDR OU moderate with DMII     of HLD, diabetes,  referral from Dr. Govea for macular edema in the left eye.      PAST OCULAR SURGERY  None    RETINAL IMAGING:  OCT 03/19/2024  OD - small MAs with mild DME extrafoveal, PHF attached  OS - large MA with mild DME, PHF attached,       ASSESSMENT & PLAN    # Moderate NPDR OU with DM II and DME   - BP/BG control      # DME OU   - noted 3/2023   - very mild   - stable  today      # syneresis OU   - advised S/Sx RD 3/2024      # NS OU   - mild/early VS        # history of allergic conjunctivitis and dry eye   - continue ketotifen and AT prn (3/2024)      Return in about 6 months (around 9/19/2024) for DFE OU, OCT OU, diagnostic MRx.       ATTESTATION     Attending Physician Attestation:      Complete documentation of historical and exam elements from today's encounter can be found in the full encounter summary report (not reduplicated in this progress note).  I personally obtained the chief complaint(s) and history of present illness.  I confirmed and edited as necessary the review of systems, past medical/surgical history, family history, social history, and examination findings as documented by others; and I examined the patient myself.  I personally reviewed the relevant tests, images, and reports as documented above.  I formulated and edited as necessary the assessment and plan and discussed the findings and management plan with the patient and family    Maricarmen Harris MD, PhD  , Vitreoretinal Surgery  Department of Ophthalmology  [facility]

## 2024-03-20 ENCOUNTER — TELEPHONE (OUTPATIENT)
Dept: OPHTHALMOLOGY | Facility: CLINIC | Age: 49
End: 2024-03-20
Payer: COMMERCIAL

## 2024-03-20 NOTE — TELEPHONE ENCOUNTER
Armond Ley [phone]      Spoke to pt/daughter at 1723    Right eye bump on lower lid noted per pt at exam yesterday    More localized swelling today (no swelling around eye)    Recommended warm compresses 4/day for 10 minutes (at least twice daily)    Reviewed can take several weeks and sometimes months to fully resolve    Reviewed if not having improvement after couple weeks to contact clinic.    Reviewed if worsens or has any new swelling around eye to contact clinc    Pt/daughter verbally demonstrated understanding and seemed comfortable with information/plan.    Edwardo Orr RN 5:28 PM 03/20/24

## 2024-03-20 NOTE — TELEPHONE ENCOUNTER
Health Call Center    Phone Message    May a detailed message be left on voicemail: yes     Reason for Call: Symptoms or Concerns     If patient has red-flag symptoms, warm transfer to triage line    Current symptom or concern: Swollen Eyes     Symptoms have been present for:  1 day(s)    Has patient previously been seen for this? No        Are there any new or worsening symptoms? Yes: worsened since this morning, per pt daughter    Pt daughter also would like to know if Dr Harris ordered any medications during the visit yesterday, please call back to discuss further.     Action Taken: Other: [facility] EYE    Travel Screening: Not Applicable

## 2024-03-27 ENCOUNTER — OFFICE VISIT (OUTPATIENT)
Dept: NEUROSURGERY | Facility: CLINIC | Age: 49
End: 2024-03-27
Attending: FAMILY MEDICINE
Payer: COMMERCIAL

## 2024-03-27 ENCOUNTER — PRE VISIT (OUTPATIENT)
Dept: NEUROSURGERY | Facility: CLINIC | Age: 49
End: 2024-03-27

## 2024-03-27 ENCOUNTER — LAB (OUTPATIENT)
Dept: LAB | Facility: CLINIC | Age: 49
End: 2024-03-27
Payer: COMMERCIAL

## 2024-03-27 VITALS
HEIGHT: 65 IN | BODY MASS INDEX: 29.99 KG/M2 | SYSTOLIC BLOOD PRESSURE: 127 MMHG | WEIGHT: 180 LBS | DIASTOLIC BLOOD PRESSURE: 82 MMHG | RESPIRATION RATE: 16 BRPM | HEART RATE: 66 BPM | OXYGEN SATURATION: 98 %

## 2024-03-27 DIAGNOSIS — D35.2 PITUITARY ADENOMA (H): Primary | ICD-10-CM

## 2024-03-27 DIAGNOSIS — D35.2 PITUITARY ADENOMA (H): ICD-10-CM

## 2024-03-27 DIAGNOSIS — A17.0 TUBERCULOUS MENINGITIS: ICD-10-CM

## 2024-03-27 DIAGNOSIS — G93.9 BRAIN LESION: ICD-10-CM

## 2024-03-27 LAB
ANION GAP SERPL CALCULATED.3IONS-SCNC: 11 MMOL/L (ref 7–15)
BUN SERPL-MCNC: 13.2 MG/DL (ref 6–20)
CALCIUM SERPL-MCNC: 9.8 MG/DL (ref 8.6–10)
CHLORIDE SERPL-SCNC: 105 MMOL/L (ref 98–107)
CORTIS SERPL-MCNC: 10.4 UG/DL
CREAT SERPL-MCNC: 1.03 MG/DL (ref 0.51–0.95)
DEPRECATED HCO3 PLAS-SCNC: 28 MMOL/L (ref 22–29)
EGFRCR SERPLBLD CKD-EPI 2021: 66 ML/MIN/1.73M2
FSH SERPL IRP2-ACNC: 81.5 MIU/ML
GLUCOSE SERPL-MCNC: 120 MG/DL (ref 70–99)
LH SERPL-ACNC: 51.2 MIU/ML
POTASSIUM SERPL-SCNC: 4.1 MMOL/L (ref 3.4–5.3)
PROLACTIN SERPL 3RD IS-MCNC: 8 NG/ML (ref 5–23)
SODIUM SERPL-SCNC: 144 MMOL/L (ref 135–145)
T4 FREE SERPL-MCNC: 0.86 NG/DL (ref 0.9–1.7)
TSH SERPL DL<=0.005 MIU/L-ACNC: 1.72 UIU/ML (ref 0.3–4.2)

## 2024-03-27 PROCEDURE — 36415 COLL VENOUS BLD VENIPUNCTURE: CPT | Performed by: PATHOLOGY

## 2024-03-27 PROCEDURE — 99203 OFFICE O/P NEW LOW 30 MIN: CPT | Performed by: NEUROLOGICAL SURGERY

## 2024-03-27 PROCEDURE — 84439 ASSAY OF FREE THYROXINE: CPT | Performed by: PATHOLOGY

## 2024-03-27 PROCEDURE — 84443 ASSAY THYROID STIM HORMONE: CPT | Performed by: PATHOLOGY

## 2024-03-27 PROCEDURE — 84305 ASSAY OF SOMATOMEDIN: CPT | Performed by: NEUROLOGICAL SURGERY

## 2024-03-27 PROCEDURE — 80048 BASIC METABOLIC PNL TOTAL CA: CPT | Performed by: PATHOLOGY

## 2024-03-27 PROCEDURE — 83003 ASSAY GROWTH HORMONE (HGH): CPT | Performed by: NEUROLOGICAL SURGERY

## 2024-03-27 PROCEDURE — 84146 ASSAY OF PROLACTIN: CPT | Performed by: NEUROLOGICAL SURGERY

## 2024-03-27 PROCEDURE — 83002 ASSAY OF GONADOTROPIN (LH): CPT | Performed by: NEUROLOGICAL SURGERY

## 2024-03-27 PROCEDURE — 82533 TOTAL CORTISOL: CPT | Performed by: NEUROLOGICAL SURGERY

## 2024-03-27 PROCEDURE — 82024 ASSAY OF ACTH: CPT | Performed by: NEUROLOGICAL SURGERY

## 2024-03-27 PROCEDURE — 83001 ASSAY OF GONADOTROPIN (FSH): CPT | Performed by: NEUROLOGICAL SURGERY

## 2024-03-27 PROCEDURE — 99000 SPECIMEN HANDLING OFFICE-LAB: CPT | Performed by: PATHOLOGY

## 2024-03-27 NOTE — PATIENT INSTRUCTIONS
You were seen today with Dr. Franki Kwon.    Next steps:  Pituitary labs today  Follow up with FARTUN Weiss in 3 months with an MRI prior to your appointment       How to Contact Us  Send a [employer] message to your provider.   Call the clinic - your call will be routed appropriately.   Neurosurgery Clinic: [phone]  To speak directly to an RN Care Coordinator:  Viri RN: [phone]  Laurel RN: [phone]    Note: We do our best to check voicemail frequently throughout the day and make every effort to return calls within 1-2 business days. For urgent matters, please use the general clinic phone numbers listed above.

## 2024-03-27 NOTE — PROGRESS NOTES
[facility]  Department of Neurosurgery  [facility]    Name: Manuela Osman  : 1975  Referring provider: Veronica Gonzales  2024        Reason for visit: Brain Lesion    Dear Dr. Veronica Gonzales,     It was a pleasure to see Ms. Osman in the [facility] today as a new patient.  As you recall, Ms. 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 On 2020, patient presented to [facility] with fever to 103, sore throat, weakness, dizziness, and neck stiffness. She went to the ED because she was having COVID like symptoms and was dehydrated. OP is done with the concern for TB meningitis. An AFB culture was positive for TB back then. She was subsequently placed on antibiotics for a year. Patient endorses fever especially in the afternoon and headache-like pain.  No vision changes, thin skin, easy bruising, changes in hand or foot size.    Of note, history was obtained using an in person [facility] .       Review of Systems:   Pertinent items are noted in HPI or as in patient entered ROS below, remainder of complete ROS is negative.      Active Medications:   Atorvastatin, Benzonatate, Calcium, Cholecalciferol, Enoxaparin, Famotidine, Farxiga, Diflucan, Flonase, Gabapentin, Goodsense pain relief, Hypromellose-dextran, Ketotifen, Losartan, Metformin, Novolog, Omeprazole, Tresiba FlexTouch     Allergies:   Lisinopril, Loratadine,      Past Medical History: diabetes, stroke     Past Surgical History:   Shoulder Surgery    Family History:   Noncontributory     Physical Exam:   General: No acute distress.   Head: No signs of trauma.    Eyes: Conjunctivae are normal.  Mouth/Throat: Oropharynx moist.  Neck: Normal range of motion.    Resp: No respiratory distress.   MSK: Moves all extremities.  No obvious deformity.  Neuro: The patient is fully oriented and quite pleasant. Speech normal.  Extraocular movements are intact without nystagmus. Facial sensation is intact in V1, V2, V3 distributions. Facial nerve function is normal, rated as a House Brackmann 1, without synkinesis.Palate is symmetric. Shoulders are full strength. Tongue is midline. There is no pronator drift. Full strength in all extremities. Sensation intact throughout.  Psych: Normal mood and affect. Behavior is normal.      Imaging:  We reviewed her recent MRI dated 03/04/24 and the lesion around the pituitary stalk. There is a small enhancing area abutting the infundibulum without definitive involvement of the pituitary gland. There is mild suprasellar extension abutting the undersurface of the right optic chiasm. The CT does not show clear calcification.     The MRI from 2020 was reviewed with shows significant enhancement in the basal cisterns and midbrain in the region of the pituitary stalk.      Laboratory: 03/27/24  TSH 1.72  FT4 0.86 (very slightly low)  Prolactin 8  LH 51.2  FSH 81.5  Cortisol 10.4  Na 144    Assessment:  Pituitary stalk region mass  History of TB meningitis and basal cistern meningitis    Plan:  We reviewed the pituitary region mass and possible diagnoses including a tumor, inflammatory lesions, or a mass related to her prior TB meningitis given the location. Given the diagnostic ambiguity, I recommended observation in the short term and an evaluation with Infectious Diseases to determine her TB status and whether this may reflect persistent infection despite her prior antibiotic course given its location.  Repeat MRI in 3 months  Referral to our Infectious Diseases colleagues  We encouraged her to contact us should any questions or concerns arise    It has been a pleasure to participate in the care of your patient. Please feel free to contact us if we may be of any assistance for Ms. Osman.      Franki Kwon MD   Department of Neurosurgery  [facility]  [facility]  [place]         40 minutes spent on the date of the encounter doing chart review, review of outside records, review of test results, interpretation of tests, patient visit, documentation and discussion with other provider(s)      Scribe Disclosure:   I, Laure Babin, am serving as a scribe; to document services personally performed by Franki Kwon MD -based on data collection and the provider's statements to me.

## 2024-03-27 NOTE — LETTER
[place] [facility]  [facility] [address]  [address]RD FLO[address]  [facility] [address]  Phone: [phone]  Fax: [phone]          3/27/2024    RE:   Manuela Osman  [address]  [facility] [address]      Dear Colleague,    Thank you for referring your patient, Manuela Osman, to the [facility]. Please see a copy of my visit note below.      [facility]  Department of Neurosurgery  [place] [facility]    Name: Manuela Osman  : 1975  Referring provider: Veronica Gonzales  2024        Reason for visit: Brain Lesion    Dear Dr. Veronica Gonzales,     It was a pleasure to see Ms. Osman in the [facility] today as a new patient.  As you recall, Ms. 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 On 2020, patient presented to [facility] with fever to 103, sore throat, weakness, dizziness, and neck stiffness. She went to the ED because she was having COVID like symptoms and was dehydrated. OP is done with the concern for TB meningitis. An AFB culture was positive for TB back then. She was subsequently placed on antibiotics for a year. Patient endorses fever especially in the afternoon and headache-like pain.  No vision changes, thin skin, easy bruising, changes in hand or foot size.    Of note, history was obtained using an in person [employer] .       Review of Systems:   Pertinent items are noted in HPI or as in patient entered ROS below, remainder of complete ROS is negative.      Active Medications:   Atorvastatin, Benzonatate, Calcium, Cholecalciferol, Enoxaparin, Famotidine, Farxiga, Diflucan, Flonase, Gabapentin, Goodsense pain relief, Hypromellose-dextran, Ketotifen, Losartan, Metformin, Novolog, Omeprazole, Tresiba FlexTouch     Allergies:   Lisinopril, Loratadine,       Past Medical History: diabetes, stroke     Past Surgical History:   Shoulder Surgery    Family History:   Noncontributory     Physical Exam:   General: No acute distress.   Head: No signs of trauma.    Eyes: Conjunctivae are normal.  Mouth/Throat: Oropharynx moist.  Neck: Normal range of motion.    Resp: No respiratory distress.   MSK: Moves all extremities.  No obvious deformity.  Neuro: The patient is fully oriented and quite pleasant. Speech normal. Extraocular movements are intact without nystagmus. Facial sensation is intact in V1, V2, V3 distributions. Facial nerve function is normal, rated as a House Brackmann 1, without synkinesis.Palate is symmetric. Shoulders are full strength. Tongue is midline. There is no pronator drift. Full strength in all extremities. Sensation intact throughout.  Psych: Normal mood and affect. Behavior is normal.      Imaging:  We reviewed her recent MRI dated 03/04/24 and the lesion around the pituitary stalk. There is a small enhancing area abutting the infundibulum without definitive involvement of the pituitary gland. There is mild suprasellar extension abutting the undersurface of the right optic chiasm. The CT does not show clear calcification.     The MRI from 2020 was reviewed with shows significant enhancement in the basal cisterns and midbrain in the region of the pituitary stalk.      Laboratory: 03/27/24  TSH 1.72  FT4 0.86 (very slightly low)  Prolactin 8  LH 51.2  FSH 81.5  Cortisol 10.4  Na 144    Assessment:  Pituitary stalk region mass  History of TB meningitis and basal cistern meningitis    Plan:  We reviewed the pituitary region mass and possible diagnoses including a tumor, inflammatory lesions, or a mass related to her prior TB meningitis given the location. Given the diagnostic ambiguity, I recommended observation in the short term and an evaluation with Infectious Diseases to determine her TB status and whether this may reflect persistent infection despite  her prior antibiotic course given its location.  Repeat MRI in 3 months  Referral to our Infectious Diseases colleagues  We encouraged her to contact us should any questions or concerns arise    It has been a pleasure to participate in the care of your patient. Please feel free to contact us if we may be of any assistance for Ms. Osman.      Franki Kwon MD   Department of Neurosurgery  [facility]  [facility]         40 minutes spent on the date of the encounter doing chart review, review of outside records, review of test results, interpretation of tests, patient visit, documentation and discussion with other provider(s)      Scribe Disclosure:   I, Laure Babin, am serving as a scribe; to document services personally performed by Franki Kwon MD -based on data collection and the provider's statements to me.       Again, thank you for allowing me to participate in the care of your patient.      Sincerely,    Franki Kwon MD

## 2024-03-27 NOTE — NURSING NOTE
Chief Complaint   Patient presents with    New Patient     Here for consult, confirmed with patient     Tamia Segal

## 2024-03-28 LAB
ACTH PLAS-MCNC: 30 PG/ML
GH SERPL-MCNC: <0.1 UG/L

## 2024-03-29 LAB — IGF-I BLD-MCNC: 85 NG/ML (ref 57–236)

## 2024-04-03 NOTE — TELEPHONE ENCOUNTER
RECORDS RECEIVED FROM:    Appt Date: 5/10/2024 @ 7:45AM     Tuberculous meningitis.    NOTES (Gather within 2 years) STATUS DETAILS   OFFICE NOTE from referring provider   Internal   [place] [place]      OFFICE NOTE from other specialist Internal OV Neurosurgery 3/27/2024- hx of meningitis     More in EPIC   LABS (any labs) Internal    MEDICATION LIST Internal

## 2024-05-08 NOTE — PROGRESS NOTES
[facility]  General Infectious Disease/HIV Clinic Note: New Patient     Patient:  Manuela Osman, Date of birth 1975   Medical record number [id number]  Date of Visit:  05/10/2024   Consult requested by Dr. Franki Kwon for evaluation of brain mass, h/o TB meningitis.     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 DMII, last HbA1c 8.7%  4. Peripheral neuropathy (2/2 isoniazid toxicity?)    Ms. Osman presents to ID clinic to discuss a new brain mass in the context of previously treated TB meningitis. After her initial diagnosis in 2020, she received an appropriate 12 month course of anti-TB therapy that was administered by the health department via DOT. I reviewed her imaging with our Neuroradiologists, and all of the prior nodular areas on her 2020 MRI have resolved, and this is a new lesion. Per discussion with them as well, the lesion appears to be more consistent with a neoplastic lesion rather than an infectious one. Normal inflammatory markers also argue against active infection, though this is not definite. She does have a persistent headache and subjective fevers (never measured) that may suggest ongoing infection/inflammation.     Unfortunately, imaging is not sufficient to diagnose or exclude an infection. She received appropriate anti-TB therapy for TB meningitis which is reassuring. However, one possibility is that the lesion is a tuberculoma. Tuberculomas can paradoxically develop during appropriate anti-TB therapy. This is a rare occurrence and very difficult to diagnose without culture and histopathologic examination of the tissue involved. We can start with an LP to assess for TB (high protein may also indicate a tuberculoma), and I will also discuss next steps with NSGY team as far as  sampling and/or resection of the lesion. Given her severe peripheral neuropathy, I do not think an empiric trial of anti-TB therapy would be appropriate.      Recommendations     Recommend LP with cell count, differential, glucose, protein, MTB PCR, AFB smear and culture  I will reach out to NSGY team to discuss next steps    RTC: 1 month or PRN    I spent a total of 68 minutes on the day of the visit.   Time spent by me doing chart review, history and exam, documentation and further activities per the note    Su Hernandez MD  Pager [phone]  Infectious Diseases      History of the Infectious Disease lllness:   CC: Brain mass, h/o TB meningitis    HPI: Manuela Osman is a pleasant 49 year old female with a past medical history of DMII (HbA1c 8.7%), HTN, vitamin D deficiency, and history of treated TB meningitis (2020), who presents to ID clinic with a new brain mass.     Per extensive chart review, Ms. Osman initially presented to an outside hospital in 11/2020 with difficulty walking, dizziness, neck stiffness, and fever. She was covid+ at the time, and underwent an LP on 11/6/20. WBC count was 294 with 51% lymphocytes, protein was 189, and glucose 17. AFB culture ultimately grew pan-susceptible TB. Imaging at the time (11/8/20) showed extensive enhancing nodularity throughout the basal cisterns, encasing the optic chiasm and involving multiple cranial nerves. On 11/8/20 RIPE therapy was started as well as steroids. When the TB was noted to be pan-susceptible, ethambutol and pyrazinamide were stopped (1/2021). She continued INH and rifampin until 4/2021 when she developed peripheral neuropathy. Due to concern that it was isoniazid related, she was switched to a regimen of rifampin + pyrazinamide + levofloxacin. She did not tolerate the levofloxacin, and was switched back to INH + rifampin. She continued this regimen until 11/2021 (12 months of therapy). In January 2022, she had ID follow up at which time  she was doing very well off TB therapy and had improving peripheral neuropathy.     Ms. Osman states that she has generally been doing well since then. However, she has had persistent low back pain. On 2/19/24 she presented to the ED with low back pain and shooting pain down the RLE. She was diagnosed with sciatica and in follow up with her PCP an MRI of the lumbar spine and brain was recommended. MRI brain on 3/4/24 showed a new enhancing lesion in the sella abutting the infundibulum. She was referred to NSGY for further evaluation. She was seen in NSGY clinic on 3/27 with the recommendation for close monitoring and follow up with ID to determine if the lesion could be related to her prior TB meningitis. Labs evaluating for a pituitary adenoma were unremarkable. On 4/20 she presented to the ED with RUE pain and weakness. Vitals were unremarkable. Labs were also unremarkable including a notably normal CRP and ESR. MRI of the brain was repeated with a stable sellar mass.  No LP was performed.     Today, Ms. Osman states she currently has a headache that radiates from her forehead through her scalp to her spine and legs. It is constant and she doesn't notice what makes it worse. She takes some medicine prescribed by her doctor that helps with the headaches. She notes she has daily fevers at 3 pm. She has never measured a fever, but states she feels warm and the headache gets bad around 3 pm every day, so she takes medication that helps. She also notes neck pain and pain shooting from the neck down the R arm. She is not having night sweats, chills, poor appetite, weight loss (has been gaining weight). She denies shortness of breath, cough, swelling in the neck/armpits/groin, rashes, abdominal pain, or joint pains.     Regarding her prior TB infection, she notes she was unconscious at the time, and doesn't remember what symptoms she had. Her medication was administered via DOT through [facility]. She notes she had diarrhea  from an antibiotic that landed her in the hospital (levofloxacin?) and that she had severe burning and pain of the lower extremities. She continues to have burning, tingling, and numbness in both legs that keeps her awake at night.   [facility] gave her medicine every day.     Social History:  Lives in [facility]. 5 children at home. 21, 20, 19, 17,11.   No pets in the house. No alcohol, no smoking.      Relevant Microbiology:   2020 CSF AFB culture: Mycobacterium tuberculosis complex        Review of Systems: The remainder of a complete ROS was negative, except as noted in HPI.     No past medical history on file.    Past Surgical History:   Procedure Laterality Date     SECTION      SHOULDER SURGERY         Family History   Problem Relation Age of Onset    Glaucoma No family hx of     Macular Degeneration No family hx of        Social History     Social History Narrative    Not on file     Social History     Tobacco Use    Smoking status: Never    Smokeless tobacco: Never       Immunization History   Administered Date(s) Administered    COVID-19 12+ (-) (MODERNA) 2023    COVID-19 Monovalent 18+ (Moderna) 2021, 2021, 11/10/2021       Patient Active Problem List   Diagnosis    Abnormal vaginal bleeding    Acute kidney injury (H24)    Anemia    COVID-19    Debility    Diabetes mellitus, type II (H)    Dizziness    Dyslipidemia    Early menopause    Encephalopathy    Essential hypertension    Helicobacter pylori infection    Hydronephrosis    Hyperlipidemia    Hyponatremia    Microscopic hematuria    Other reactions to severe stress    Overweight with body mass index (BMI) 25.0-29.9    Pain    Microalbuminuria    Pyelonephritis    Retention of urine    Sepsis (H)    Sinus pressure    Tuberculous meningitis    Vitamin D deficiency       Current Outpatient Medications   Medication Sig Dispense Refill    atorvastatin (LIPITOR) 20 MG tablet       benzonatate (TESSALON) 100 MG capsule   "(Patient not taking: Reported on 2023)      Calcium Carb-Cholecalciferol 500-10 MG-MCG TABS Calcium 500 With D 500 mg-10 mcg (400 unit) tablet      carboxymethylcellulose PF (CARBOXYMETHYLCELLULOSE SODIUM) 0.5 % ophthalmic solution Place 1 drop into both eyes 4 times daily 400 each 11    cholecalciferol (VITAMIN D3) 125 mcg (5000 units) capsule       enoxaparin ANTICOAGULANT (LOVENOX) 40 MG/0.4ML syringe enoxaparin 40 mg/0.4 mL subcutaneous syringe (Patient not taking: Reported on 2023)      famotidine (PEPCID) 20 MG tablet       FARXIGA 10 MG TABS tablet       fluconazole (DIFLUCAN) 150 MG tablet  (Patient not taking: Reported on 2023)      fluticasone (FLONASE) 50 MCG/ACT nasal spray       gabapentin (NEURONTIN) 300 MG capsule       GOODSENSE PAIN RELIEF EXTRA  MG tablet       HYPROMELLOSE-DEXTRAN 0.3-0.1% opthalmic solution       ketotifen (ZADITOR) 0.025 % ophthalmic solution       losartan (COZAAR) 25 MG tablet       metFORMIN (GLUCOPHAGE XR) 500 MG 24 hr tablet       NOVOLOG FLEXPEN 100 UNIT/ML soln       omeprazole (PRILOSEC) 20 MG DR capsule       TRESIBA FLEXTOUCH 100 UNIT/ML pen       UNKNOWN TO PATIENT Patient does not know any of the medications she is taking.       No current facility-administered medications for this visit.       Allergies   Allergen Reactions    Lisinopril Cough and Itching     Other reaction(s): Other (see comments)    Loratadine Itching     Other reaction(s): Other (see comments), Runny Nose    No Clinical Screening - See Comments      Other reaction(s): Unknown  \"allergy to something given during surgery for \"     Perfume Other (See Comments)     Sneezing and congestion  Other reaction(s): Other - Describe In Comment Field  Sneezing and congestion                Physical Exam:   /79   Pulse 63   Temp 98  F (36.7  C) (Oral)   Ht 1.651 m (5' 5\")   Wt 79.9 kg (176 lb 1.6 oz)   SpO2 100%   BMI 29.30 kg/m    Wt Readings from Last 4 Encounters: "   24 81.6 kg (180 lb)   23 76.7 kg (169 lb 1.6 oz)     Exam:   GENERAL: well-developed, well-nourished, alert, oriented, in no acute distress.  HEAD: Head is normocephalic, atraumatic   EYES: Eyes have anicteric sclerae.    ENT: Oropharynx is moist without exudates or ulcers.  NECK: Supple. Full ROM w/ no meningismus.   LUNGS: Clear to auscultation b/l. Normal WOB on RA.   CARDIOVASCULAR: Regular rate and rhythm with no murmurs, gallops or rubs.  ABDOMEN: Normal bowel sounds, soft, nontender.  SKIN: No acute rashes. Wounds on b/l Les.   NEUROLOGIC: Grossly nonfocal. Strength 5/5 and symmetric in b/l upper and lower extremities. CN II-XII intact.          Laboratory Data:     Metabolic Studies    Recent Labs   Lab Test 24  1034      POTASSIUM 4.1   CHLORIDE 105   CO2 28   ANIONGAP 11   BUN 13.2   CR 1.03*   GFRESTIMATED 66   *   TOSHIA 9.8   RANDY 10.4     Imagin24 MRI Brain  Impression    1. No interval change.  2. No acute intracranial abnormality.  3. Normal brain parenchymal morphology. Scattered nonspecific foci of T2 signal within the white matter of both cerebral hemispheres likely represents chronic deep white matter small vessel ischemic changes or [place] of migraine headache. There is an old lacunar infarct of the left centrum semiovale.  4. No abnormal enhancement or enhancing lesions within the brain parenchyma  5. Compared to the previous MRI, stable size and appearance of an enhancing nodule within the right aspect of the sella abutting the infundibulum and separate from the pituitary gland. 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  6. Small right mastoid effusion

## 2024-05-10 ENCOUNTER — PRE VISIT (OUTPATIENT)
Dept: INFECTIOUS DISEASES | Facility: CLINIC | Age: 49
End: 2024-05-10
Payer: COMMERCIAL

## 2024-05-10 ENCOUNTER — OFFICE VISIT (OUTPATIENT)
Dept: INFECTIOUS DISEASES | Facility: CLINIC | Age: 49
End: 2024-05-10
Attending: NEUROLOGICAL SURGERY
Payer: COMMERCIAL

## 2024-05-10 VITALS
BODY MASS INDEX: 29.34 KG/M2 | DIASTOLIC BLOOD PRESSURE: 79 MMHG | OXYGEN SATURATION: 100 % | HEART RATE: 63 BPM | TEMPERATURE: 98 F | HEIGHT: 65 IN | WEIGHT: 176.1 LBS | SYSTOLIC BLOOD PRESSURE: 133 MMHG

## 2024-05-10 DIAGNOSIS — A17.0 TUBERCULOUS MENINGITIS: ICD-10-CM

## 2024-05-10 PROCEDURE — 99205 OFFICE O/P NEW HI 60 MIN: CPT | Performed by: INTERNAL MEDICINE

## 2024-05-10 PROCEDURE — G0463 HOSPITAL OUTPT CLINIC VISIT: HCPCS | Performed by: INTERNAL MEDICINE

## 2024-05-10 ASSESSMENT — PAIN SCALES - GENERAL: PAINLEVEL: NO PAIN (0)

## 2024-05-10 NOTE — NURSING NOTE
"Chief Complaint   Patient presents with    New Patient     /79   Pulse 63   Temp 98  F (36.7  C) (Oral)   Ht 1.651 m (5' 5\")   Wt 79.9 kg (176 lb 1.6 oz)   SpO2 100%   BMI 29.30 kg/m    Shabbir Nixon MA on 5/10/2024 at 8:05 AM    "

## 2024-05-13 ENCOUNTER — APPOINTMENT (OUTPATIENT)
Dept: INTERPRETER SERVICES | Facility: CLINIC | Age: 49
End: 2024-05-13
Payer: COMMERCIAL

## 2024-05-13 ENCOUNTER — TELEPHONE (OUTPATIENT)
Dept: INFECTIOUS DISEASES | Facility: CLINIC | Age: 49
End: 2024-05-13
Payer: COMMERCIAL

## 2024-05-13 NOTE — TELEPHONE ENCOUNTER
RADHA + arlene called 5/13 to sched a 1 month follow up with Dr. Hernandez per checkout notes from 5/10.

## 2024-05-14 ENCOUNTER — CARE COORDINATION (OUTPATIENT)
Dept: NEUROSURGERY | Facility: CLINIC | Age: 49
End: 2024-05-14
Payer: COMMERCIAL

## 2024-05-14 ENCOUNTER — TELEPHONE (OUTPATIENT)
Dept: NEUROSURGERY | Facility: CLINIC | Age: 49
End: 2024-05-14
Payer: COMMERCIAL

## 2024-05-14 NOTE — PROGRESS NOTES
Writer obtained MR Brain, CT Head and US Head Neck from [facility].     Sharon Brizuela LPN  Neurosurgery

## 2024-05-14 NOTE — TELEPHONE ENCOUNTER
Left Voicemail (1st Attempt) for the patient to call back and schedule the following:    Appointment type: Return skull base  Provider: Alexandra Toribio  Return date: End of June 2024  Specialty phone number: [phone]  Additional appointment(s) needed: MRI prior  Additonal Notes: Sched MRI prior to clinic visit

## 2024-05-16 ENCOUNTER — TELEPHONE (OUTPATIENT)
Dept: NEUROSURGERY | Facility: CLINIC | Age: 49
End: 2024-05-16
Payer: COMMERCIAL

## 2024-05-16 NOTE — TELEPHONE ENCOUNTER
Sent Mychart (1st Attempt) and Left Voicemail (2nd Attempt) for the patient to call back and schedule the following:    Appointment type: Return skull base  Provider: Alexandra Toribio  Return date: End of June 2024  Specialty phone number: [phone]  Additional appointment(s) needed: MRI prior  Additonal Notes: Sched MRI prior to appt

## 2024-05-21 ENCOUNTER — TELEPHONE (OUTPATIENT)
Dept: NEUROSURGERY | Facility: CLINIC | Age: 49
End: 2024-05-21
Payer: COMMERCIAL

## 2024-05-21 NOTE — TELEPHONE ENCOUNTER
Patient confirmed scheduled appointment:  Date: 6/28/24  Time: 11:00 am  Visit type: Return Skullbase  Provider: Alexandra Toribio  Location: [facility]  Testing/imaging: MRI 6/24 or 6/25  Additional notes: transferred to imaging to help schedule MRI    Dianne Solis on 5/21/2024 at 12:12 PM

## 2024-05-29 ENCOUNTER — LAB (OUTPATIENT)
Dept: LAB | Facility: CLINIC | Age: 49
End: 2024-05-29
Attending: RADIOLOGY
Payer: COMMERCIAL

## 2024-05-29 ENCOUNTER — HOSPITAL ENCOUNTER (OUTPATIENT)
Dept: GENERAL RADIOLOGY | Facility: CLINIC | Age: 49
Discharge: HOME | End: 2024-05-29
Attending: INTERNAL MEDICINE | Admitting: RADIOLOGY
Payer: COMMERCIAL

## 2024-05-29 ENCOUNTER — APPOINTMENT (OUTPATIENT)
Dept: MEDSURG UNIT | Facility: CLINIC | Age: 49
End: 2024-05-29
Attending: RADIOLOGY
Payer: COMMERCIAL

## 2024-05-29 ENCOUNTER — HOSPITAL ENCOUNTER (OUTPATIENT)
Facility: CLINIC | Age: 49
Discharge: HOME | End: 2024-05-29
Attending: RADIOLOGY | Admitting: RADIOLOGY
Payer: COMMERCIAL

## 2024-05-29 VITALS
HEART RATE: 60 BPM | SYSTOLIC BLOOD PRESSURE: 121 MMHG | RESPIRATION RATE: 18 BRPM | TEMPERATURE: 98.1 F | WEIGHT: 175.3 LBS | HEIGHT: 65 IN | OXYGEN SATURATION: 99 % | BODY MASS INDEX: 29.2 KG/M2 | DIASTOLIC BLOOD PRESSURE: 61 MMHG

## 2024-05-29 DIAGNOSIS — R42 DIZZINESS: ICD-10-CM

## 2024-05-29 DIAGNOSIS — F43.89 OTHER REACTIONS TO SEVERE STRESS: ICD-10-CM

## 2024-05-29 DIAGNOSIS — N93.9 ABNORMAL VAGINAL BLEEDING: ICD-10-CM

## 2024-05-29 DIAGNOSIS — E55.9 VITAMIN D DEFICIENCY: ICD-10-CM

## 2024-05-29 DIAGNOSIS — N17.9 ACUTE KIDNEY INJURY (H): ICD-10-CM

## 2024-05-29 DIAGNOSIS — E66.3 OVERWEIGHT WITH BODY MASS INDEX (BMI) 25.0-29.9: ICD-10-CM

## 2024-05-29 DIAGNOSIS — D35.2 PITUITARY ADENOMA (H): Primary | ICD-10-CM

## 2024-05-29 DIAGNOSIS — N12 PYELONEPHRITIS: ICD-10-CM

## 2024-05-29 DIAGNOSIS — J34.89 SINUS PRESSURE: ICD-10-CM

## 2024-05-29 DIAGNOSIS — R53.81 DEBILITY: ICD-10-CM

## 2024-05-29 DIAGNOSIS — R80.9 MICROALBUMINURIA: ICD-10-CM

## 2024-05-29 DIAGNOSIS — E87.1 HYPONATREMIA: ICD-10-CM

## 2024-05-29 DIAGNOSIS — A17.0 TUBERCULOUS MENINGITIS: ICD-10-CM

## 2024-05-29 DIAGNOSIS — R52 PAIN: ICD-10-CM

## 2024-05-29 DIAGNOSIS — R31.29 MICROSCOPIC HEMATURIA: Primary | ICD-10-CM

## 2024-05-29 DIAGNOSIS — I10 ESSENTIAL HYPERTENSION: ICD-10-CM

## 2024-05-29 DIAGNOSIS — R33.9 RETENTION OF URINE: ICD-10-CM

## 2024-05-29 DIAGNOSIS — G93.40 ENCEPHALOPATHY: ICD-10-CM

## 2024-05-29 LAB
APPEARANCE CSF: ABNORMAL
COLOR CSF: ABNORMAL
EOSINOPHIL NFR CSF MANUAL: 1 %
ERYTHROCYTE [DISTWIDTH] IN BLOOD BY AUTOMATED COUNT: 13.2 % (ref 10–15)
GLUCOSE BLDC GLUCOMTR-MCNC: 117 MG/DL (ref 70–99)
GLUCOSE BLDC GLUCOMTR-MCNC: 93 MG/DL (ref 70–99)
GLUCOSE CSF-MCNC: 88 MG/DL (ref 40–70)
HCT VFR BLD AUTO: 43.3 % (ref 35–47)
HGB BLD-MCNC: 14.3 G/DL (ref 11.7–15.7)
HOLD SPECIMEN: NORMAL
INR PPP: 1 (ref 0.85–1.15)
LYMPH ABN NFR CSF MANUAL: 58 %
MCH RBC QN AUTO: 30.4 PG (ref 26.5–33)
MCHC RBC AUTO-ENTMCNC: 33 G/DL (ref 31.5–36.5)
MCV RBC AUTO: 92 FL (ref 78–100)
MONOS+MACROS NFR CSF MANUAL: 20 %
NEUTROPHILS NFR CSF MANUAL: 21 %
PLATELET # BLD AUTO: 248 10E3/UL (ref 150–450)
PROT CSF-MCNC: 63.3 MG/DL (ref 15–45)
RBC # BLD AUTO: 4.7 10E6/UL (ref 3.8–5.2)
RBC # CSF MANUAL: 3000 /UL (ref 0–2)
TUBE # CSF: 4
WBC # BLD AUTO: 8 10E3/UL (ref 4–11)
WBC # CSF MANUAL: 7 /UL (ref 0–5)

## 2024-05-29 PROCEDURE — 84157 ASSAY OF PROTEIN OTHER: CPT

## 2024-05-29 PROCEDURE — 250N000009 HC RX 250: Performed by: RADIOLOGY

## 2024-05-29 PROCEDURE — 82945 GLUCOSE OTHER FLUID: CPT

## 2024-05-29 PROCEDURE — 88108 CYTOPATH CONCENTRATE TECH: CPT | Mod: TC

## 2024-05-29 PROCEDURE — 62328 DX LMBR SPI PNXR W/FLUOR/CT: CPT

## 2024-05-29 PROCEDURE — 36415 COLL VENOUS BLD VENIPUNCTURE: CPT | Performed by: NURSE PRACTITIONER

## 2024-05-29 PROCEDURE — 88108 CYTOPATH CONCENTRATE TECH: CPT | Mod: 26 | Performed by: PATHOLOGY

## 2024-05-29 PROCEDURE — 82962 GLUCOSE BLOOD TEST: CPT

## 2024-05-29 PROCEDURE — 85610 PROTHROMBIN TIME: CPT | Performed by: NURSE PRACTITIONER

## 2024-05-29 PROCEDURE — 87556 M.TUBERCULO DNA AMP PROBE: CPT | Performed by: INTERNAL MEDICINE

## 2024-05-29 PROCEDURE — 999N000142 HC STATISTIC PROCEDURE PREP ONLY

## 2024-05-29 PROCEDURE — 89051 BODY FLUID CELL COUNT: CPT

## 2024-05-29 PROCEDURE — 85027 COMPLETE CBC AUTOMATED: CPT | Performed by: NURSE PRACTITIONER

## 2024-05-29 PROCEDURE — 62328 DX LMBR SPI PNXR W/FLUOR/CT: CPT | Mod: GC | Performed by: RADIOLOGY

## 2024-05-29 PROCEDURE — 84311 SPECTROPHOTOMETRY: CPT

## 2024-05-29 PROCEDURE — 87206 SMEAR FLUORESCENT/ACID STAI: CPT

## 2024-05-29 PROCEDURE — 999N000133 HC STATISTIC PP CARE STAGE 2

## 2024-05-29 RX ORDER — ASPIRIN 81 MG/1
81 TABLET ORAL AT BEDTIME
COMMUNITY

## 2024-05-29 RX ORDER — ACETAMINOPHEN 325 MG/1
650 TABLET ORAL EVERY 4 HOURS PRN
Status: DISCONTINUED | OUTPATIENT
Start: 2024-05-29 | End: 2024-05-29 | Stop reason: HOSPADM

## 2024-05-29 RX ORDER — LIDOCAINE HYDROCHLORIDE 10 MG/ML
10 INJECTION, SOLUTION EPIDURAL; INFILTRATION; INTRACAUDAL; PERINEURAL ONCE
Status: COMPLETED | OUTPATIENT
Start: 2024-05-29 | End: 2024-05-29

## 2024-05-29 RX ORDER — ACETAMINOPHEN 325 MG/1
650 TABLET ORAL EVERY 4 HOURS PRN
Status: CANCELLED | OUTPATIENT
Start: 2024-05-29

## 2024-05-29 RX ADMIN — LIDOCAINE HYDROCHLORIDE 30 ML: 10 INJECTION, SOLUTION EPIDURAL; INFILTRATION; INTRACAUDAL; PERINEURAL at 13:13

## 2024-05-29 ASSESSMENT — ACTIVITIES OF DAILY LIVING (ADL)
ADLS_ACUITY_SCORE: 35

## 2024-05-29 NOTE — PROGRESS NOTES
Pt arrived to 2A with her daughter and son.  Up on arrival to 2A: pt is stable, AVSS and pain free.  [place] interpretor is at bed side.  INR and CBC resulted.  Hx of early menopause and pt said her last period was 3 years ago .  Awaiting provider for consent to be signed.  Blood Sugar 117 at noon.      Pt stated that she is not on Lovenox shot.  Pt said she takes low dose of Aspirin.

## 2024-05-29 NOTE — DISCHARGE INSTRUCTIONS
[facility]                                      Radiology Discharge instructions Post Lumbar Puncture         AFTER YOU GO HOME    Relax and take it easy for 24 hours  We suggest bedrest until the next morning, except to go to the bathroom.  You may resume normal activity tomorrow  You may remove the bandage on your back in the evening or next morning  You may resume bathing the next day  Drink at least 4 glasses of extra fluid today if not on a fluid restriction  DO NOT drive or operate machinery at home or at work for at least 24 hours                   CALL YOUR PRIMARY PHYSICIAN IF:  If you start to leak a large amount of fluid from the puncture site, lie down flat on your back. Call your primary physician, they will tell you if you need or return to the hospital  You develop a severe headache  You develop nausea or vomiting   You develop a temperature of 101 degrees or greater                 ADDITIONAL INSTRUCTIONS:         [facility] RADIOLOGY DEPARTMENT             Procedure Physician:Taiwo Hansen MD                                  Date of Procedure:May 29, 2024               [facility]...........[phone].......Ask for the Radiologist on call. Someone is on call 24 hour/day               [facility] Toll Free Number.........[phone].....Monday-Friday 8:00 am to 4:30 pm    .

## 2024-05-29 NOTE — PROGRESS NOTES
D/I/A: pt is stable, AVSS and denied headache at this time.  LE and UE sensation intact.  Utilized [employer] interpreotor via I POD.  Discharge instructions reviewed with patient and and children.   Questions answered at this time.    pt tolerated PO intake. Pt was able to urinate post procedure after ending bed rest.  LP puncture site drsg CDI/no bleeding or hematoma.

## 2024-05-29 NOTE — PROGRESS NOTES
Pt came back from Xray. S/p LP.  Up on arrival to 2A: pt is stable, AVSS and c/o 7/10 headache.  LP puncture site drsg CDI/no bleeding or hematoma.  Keeping pt flat on bed x1 hr per report.     Time: 1515  Called Neuro-rad at [phone]  Spoke to Dr. Hansen  Notified MD that pt is having a 7/10 headache. MD ordered Tyelnol and call back if headache is getting worst. To keep pt on flat bed rest for 1 hr then discharge to home.

## 2024-05-31 LAB
ADENOSINE DEAMINASE CSF-CCNC: <1 U/L
PATH REPORT.COMMENTS IMP SPEC: ABNORMAL
PATH REPORT.COMMENTS IMP SPEC: ABNORMAL
PATH REPORT.COMMENTS IMP SPEC: YES
PATH REPORT.FINAL DX SPEC: ABNORMAL
PATH REPORT.GROSS SPEC: ABNORMAL
PATH REPORT.MICROSCOPIC SPEC OTHER STN: ABNORMAL
PATH REPORT.RELEVANT HX SPEC: ABNORMAL

## 2024-06-01 LAB
ANNOTATION COMMENT IMP: NOT DETECTED
DEPRECATED M TB RPOB XXX QL NAA+PROBE: NORMAL
M TB DNA SPEC QL NAA+PROBE: NOT DETECTED

## 2024-06-12 NOTE — PROGRESS NOTES
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 Osman presents to ID clinic for follow up of a sellar lesion of unclear etiology. I recommended CSF evaluation due to the possibility of a tuberculoma (which can develop while on appropriate TB meningitis therapy). The CSF examination, though bloody, was within normal limits and not concerning for persistent TB. AFB culture is the last test pending. Her headaches seem to have worsened somewhat from her last visit, but she continues to deny constitutional symptoms. We discussed increasing OTC pain medication for headaches while she awaits follow up with NSGY (in 2 weeks). I will follow up her pending cultures, but at this point my suspicion for an infectious etiology of the mass is low. Will defer additional workup/management to NSGY team.      Recommendations     Follow pending AFB culture from CSF.   Low suspicion of infectious etiology of sellar mass. Will defer ongoing workup to NSGY team.   Discussed increased dose of acetaminophen with pt and provided prescription.     RTC: PRN    I spent a total of 30 minutes on the day of the visit.   Time spent by me doing chart review, history and exam, documentation and further activities per the note    Su Hernandez MD  Pager [phone]  Infectious Diseases     Interval Updates:   - S/p lumbar puncture 5/29/24. Elevated RBCs (3,000) with 7 nucleated cells and slightly elevated protein (63.3) and normal glucose. ADA and MTB PCR  "negative, AFB smear is negative, culture is pending but negative thus far. Abnormalities are likely due to elevated RBCs (ie bloody tap).   - Cytology with \"solitary atypical cell cluster noted,\" specimen was contaminated by peripheral blood.   - Sees NSGY on 6/28.   - Today, Ms. Osman is overall feeling stable, though she has had persistent headaches. She describes them as involving the top of her head and now her face as well. She has pain with light touch and it is not localized to her sinuses or teeth. She takes 500 mg acetaminophen once per day with some relief. She has not tried taking more than this. When the headaches get back she sometimes feels feverish. She has watery eyes and gets blurred vision from this. Denies persistent double vision.   - She otherwise denies fevers, chills, night sweats, weight loss, cough, SOB, or chest pain.      History of the Infectious Disease lllness:   CC: Brain mass, h/o TB meningitis     HPI: Manuela Osman is a pleasant 49 year old female with a past medical history of DMII (HbA1c 8.7%), HTN, vitamin D deficiency, and history of treated TB meningitis (2020), who presents to ID clinic with a new brain mass.      Per extensive chart review, Ms. Osman initially presented to an outside hospital in 11/2020 with difficulty walking, dizziness, neck stiffness, and fever. She was covid+ at the time, and underwent an LP on 11/6/20. WBC count was 294 with 51% lymphocytes, protein was 189, and glucose 17. AFB culture ultimately grew pan-susceptible TB. Imaging at the time (11/8/20) showed extensive enhancing nodularity throughout the basal cisterns, encasing the optic chiasm and involving multiple cranial nerves. On 11/8/20 RIPE therapy was started as well as steroids. When the TB was noted to be pan-susceptible, ethambutol and pyrazinamide were stopped (1/2021). She continued INH and rifampin until 4/2021 when she developed peripheral neuropathy. Due to concern that it was isoniazid " related, she was switched to a regimen of rifampin + pyrazinamide + levofloxacin. She did not tolerate the levofloxacin, and was switched back to INH + rifampin. She continued this regimen until 11/2021 (12 months of therapy). In January 2022, she had ID follow up at which time she was doing very well off TB therapy and had improving peripheral neuropathy.      Ms. Osman states that she has generally been doing well since then. However, she has had persistent low back pain. On 2/19/24 she presented to the ED with low back pain and shooting pain down the RLE. She was diagnosed with sciatica and in follow up with her PCP an MRI of the lumbar spine and brain was recommended. MRI brain on 3/4/24 showed a new enhancing lesion in the sella abutting the infundibulum. She was referred to NSGY for further evaluation. She was seen in NSGY clinic on 3/27 with the recommendation for close monitoring and follow up with ID to determine if the lesion could be related to her prior TB meningitis. Labs evaluating for a pituitary adenoma were unremarkable. On 4/20 she presented to the ED with RUE pain and weakness. Vitals were unremarkable. Labs were also unremarkable including a notably normal CRP and ESR. MRI of the brain was repeated with a stable sellar mass.  No LP was performed.      Today, Ms. Osman states she currently has a headache that radiates from her forehead through her scalp to her spine and legs. It is constant and she doesn't notice what makes it worse. She takes some medicine prescribed by her doctor that helps with the headaches. She notes she has daily fevers at 3 pm. She has never measured a fever, but states she feels warm and the headache gets bad around 3 pm every day, so she takes medication that helps. She also notes neck pain and pain shooting from the neck down the R arm. She is not having night sweats, chills, poor appetite, weight loss (has been gaining weight). She denies shortness of breath, cough,  swelling in the neck/armpits/groin, rashes, abdominal pain, or joint pains.      Regarding her prior TB infection, she notes she was unconscious at the time, and doesn't remember what symptoms she had. Her medication was administered via DOT through [facility]. She notes she had diarrhea from an antibiotic that landed her in the hospital (levofloxacin?) and that she had severe burning and pain of the lower extremities. She continues to have burning, tingling, and numbness in both legs that keeps her awake at night.   [facility] gave her medicine every day.      Social History:  Lives in [facility]. 5 children at home. 21, 20, 19, 17,11.   No pets in the house. No alcohol, no smoking.      Relevant Microbiology:    24 LP   Cell count: 7 nucleated cells (21% neutrophils, 58% lymphocytes), 3,000 RBCs. Protein 63.3, glucose 88. MTB PCR negative, ADA negative.   AFB smear: negative   AFB culture: NGTD   Cytology:   Interpretation -   - Solitary atypical cell cluster noted (see comment)  - Abundant red blood cells noted in the background     Recent Antimicrobials:   None.     Review of Systems: The remainder of a complete ROS was negative, except as noted in HPI.     Past Medical History:   Diagnosis Date    Menopause present     Per pt Early menopause for 3 years       Past Surgical History:   Procedure Laterality Date     SECTION      SHOULDER SURGERY         Immunization History   Administered Date(s) Administered    COVID-19 12+ (-) (MODERNA) 2023    COVID-19 Monovalent 18+ (Moderna) 2021, 2021, 11/10/2021       Patient Active Problem List   Diagnosis    Abnormal vaginal bleeding    Acute kidney injury (H24)    Anemia    COVID-19    Debility    Diabetes mellitus, type II (H)    Dizziness    Dyslipidemia    Early menopause    Encephalopathy    Essential hypertension    Helicobacter pylori infection    Hydronephrosis    Hyperlipidemia    Hyponatremia    Microscopic hematuria    Other  "reactions to severe stress    Overweight with body mass index (BMI) 25.0-29.9    Pain    Microalbuminuria    Pyelonephritis    Retention of urine    Sepsis (H)    Sinus pressure    Tuberculous meningitis    Vitamin D deficiency       Current Outpatient Medications   Medication Sig Dispense Refill    aspirin 81 MG EC tablet Take 81 mg by mouth daily Per pt she takes baba ASA. She is not a 100% sure.      atorvastatin (LIPITOR) 20 MG tablet       benzonatate (TESSALON) 100 MG capsule       Calcium Carb-Cholecalciferol 500-10 MG-MCG TABS Calcium 500 With D 500 mg-10 mcg (400 unit) tablet      carboxymethylcellulose PF (CARBOXYMETHYLCELLULOSE SODIUM) 0.5 % ophthalmic solution Place 1 drop into both eyes 4 times daily 400 each 11    cholecalciferol (VITAMIN D3) 125 mcg (5000 units) capsule       enoxaparin ANTICOAGULANT (LOVENOX) 40 MG/0.4ML syringe       famotidine (PEPCID) 20 MG tablet       FARXIGA 10 MG TABS tablet       fluconazole (DIFLUCAN) 150 MG tablet       fluticasone (FLONASE) 50 MCG/ACT nasal spray       gabapentin (NEURONTIN) 300 MG capsule       GOODSENSE PAIN RELIEF EXTRA  MG tablet       HYPROMELLOSE-DEXTRAN 0.3-0.1% opthalmic solution       ketotifen (ZADITOR) 0.025 % ophthalmic solution       losartan (COZAAR) 25 MG tablet       metFORMIN (GLUCOPHAGE XR) 500 MG 24 hr tablet       NOVOLOG FLEXPEN 100 UNIT/ML soln       omeprazole (PRILOSEC) 20 MG DR capsule       TRESIBA FLEXTOUCH 100 UNIT/ML pen       UNKNOWN TO PATIENT Patient does not know any of the medications she is taking.       No current facility-administered medications for this visit.       Allergies   Allergen Reactions    Lisinopril Cough and Itching     Other reaction(s): Other (see comments)    Loratadine Itching     Other reaction(s): Other (see comments), Runny Nose    No Clinical Screening - See Comments      Other reaction(s): Unknown  \"allergy to something given during surgery for \"     Perfume Other (See Comments)     " Sneezing and congestion  Other reaction(s): Other - Describe In Comment Field  Sneezing and congestion              Physical Exam:   /80 (BP Location: Left arm, Patient Position: Sitting, Cuff Size: Adult Large)   Pulse 77   Temp 97.8  F (36.6  C) (Oral)   Wt 80.7 kg (178 lb)   SpO2 97%   BMI 29.62 kg/m    Wt Readings from Last 4 Encounters:   05/29/24 79.5 kg (175 lb 4.8 oz)   05/10/24 79.9 kg (176 lb 1.6 oz)   03/27/24 81.6 kg (180 lb)   08/31/23 76.7 kg (169 lb 1.6 oz)     Exam:   GENERAL: well-developed, well-nourished, alert, oriented, in no acute distress.  HEAD: Head is normocephalic, atraumatic   EYES: Eyes have anicteric sclerae.    ENT: Oropharynx is moist without exudates or ulcers.  NECK: Supple.  LUNGS: Normal WOB on RA.   NEUROLOGIC: Grossly nonfocal.         Laboratory Data:     Metabolic Studies    Recent Labs   Lab Test 05/29/24  1551 05/29/24  1206 03/27/24  1034   NA  --   --  144   POTASSIUM  --   --  4.1   CHLORIDE  --   --  105   CO2  --   --  28   ANIONGAP  --   --  11   BUN  --   --  13.2   CR  --   --  1.03*   GFRESTIMATED  --   --  66   GLC 93   < > 120*   TOSHIA  --   --  9.8   RANDY  --   --  10.4    < > = values in this interval not displayed.     Hematology Studies     Recent Labs   Lab Test 05/29/24  1127   WBC 8.0   HGB 14.3   HCT 43.3        Imaging:   Results for orders placed or performed during the hospital encounter of 05/29/24   XR Lumbar Puncture Spinal Tap Diagnostic    Narrative    PROCEDURE: Lumbar Puncture using Fluoroscopy, 5/29/2024 2:59 PM    HISTORY: Tuberculous meningitis.    COMPARISON: None.    STAFF NEURORADIOLOGIST: Dr. Genaro Nelson    FELLOW PHYSICIAN: Dr. Kip Hansen    TECHNIQUE: Verbal and written consent for lumbar puncture was obtained  from the patient, and benefits and risk of the procedure were  explained, including but not limited to worsening headache,  hemorrhage, infection, lower extremity pain, or nerve root injury. The  patient was  sterilely prepped and draped with the patient in the prone  position, over the lower back. Under fluoroscopic guidance, the  interlaminar spaces were noted. 1% lidocaine was administered for  local anesthetic over the L2-3 interlaminar space, and a 22 gauge 3.5  inch needle was advanced into the thecal sac under fluoroscopic  guidance.      There was initial show of blood tinged CSF. Opening pressure was 18 cm  CSF. Approximately 14 cc of CSF were collected.    The needle was removed with the stylet in place. There was no  immediate complication associated with the procedure. Samples were  sent for the requested laboratory testing.      FLUORO TIME: 33 seconds.    DOSE: 10.7 mGy      Impression    IMPRESSION: Successful lumbar puncture without immediate complication.    PLAN: Patient discharged to patient care unit in stable condition for  monitoring. Orders placed for 1 hour of bed rest with patient laying  on the back and the head of the bed flat.    I, DAVID CORMIER MD, attest that I was present for all critical  portions of the procedure and was immediately available to provide  guidance and assistance during the remainder of the procedure.    I have personally reviewed the examination and initial interpretation  and I agree with the findings.    DAVID CORMIER MD         SYSTEM ID:  [id number]

## 2024-06-13 ENCOUNTER — OFFICE VISIT (OUTPATIENT)
Dept: CT IMAGING | Facility: CLINIC | Age: 49
End: 2024-06-13
Attending: INTERNAL MEDICINE
Payer: COMMERCIAL

## 2024-06-13 VITALS
WEIGHT: 178 LBS | DIASTOLIC BLOOD PRESSURE: 80 MMHG | HEART RATE: 77 BPM | OXYGEN SATURATION: 97 % | SYSTOLIC BLOOD PRESSURE: 131 MMHG | BODY MASS INDEX: 29.62 KG/M2 | TEMPERATURE: 97.8 F

## 2024-06-13 DIAGNOSIS — Z86.11: ICD-10-CM

## 2024-06-13 DIAGNOSIS — H91.90 DECREASED HEARING, UNSPECIFIED LATERALITY: Primary | ICD-10-CM

## 2024-06-13 PROCEDURE — G0463 HOSPITAL OUTPT CLINIC VISIT: HCPCS | Performed by: INTERNAL MEDICINE

## 2024-06-13 PROCEDURE — 99214 OFFICE O/P EST MOD 30 MIN: CPT | Performed by: INTERNAL MEDICINE

## 2024-06-13 RX ORDER — ACETAMINOPHEN 500 MG
500-1000 TABLET ORAL EVERY 6 HOURS PRN
Qty: 100 TABLET | Refills: 1 | Status: SHIPPED | OUTPATIENT
Start: 2024-06-13

## 2024-06-13 ASSESSMENT — PAIN SCALES - GENERAL: PAINLEVEL: SEVERE PAIN (6)

## 2024-06-13 NOTE — NURSING NOTE
Chief Complaint   Patient presents with    RECHECK     /80 (BP Location: Left arm, Patient Position: Sitting, Cuff Size: Adult Large)   Pulse 77   Temp 97.8  F (36.6  C) (Oral)   Wt 80.7 kg (178 lb)   SpO2 97%   BMI 29.62 kg/m

## 2024-06-25 ENCOUNTER — MEDICAL CORRESPONDENCE (OUTPATIENT)
Dept: HEALTH INFORMATION MANAGEMENT | Facility: CLINIC | Age: 49
End: 2024-06-25
Payer: COMMERCIAL

## 2024-06-25 ENCOUNTER — TRANSFERRED RECORDS (OUTPATIENT)
Dept: HEALTH INFORMATION MANAGEMENT | Facility: CLINIC | Age: 49
End: 2024-06-25
Payer: COMMERCIAL

## 2024-06-26 ENCOUNTER — TRANSCRIBE ORDERS (OUTPATIENT)
Dept: OTHER | Age: 49
End: 2024-06-26

## 2024-06-26 DIAGNOSIS — L81.9 HYPERPIGMENTATION: Primary | ICD-10-CM

## 2024-06-27 ENCOUNTER — TRANSCRIBE ORDERS (OUTPATIENT)
Dept: OTHER | Age: 49
End: 2024-06-27

## 2024-06-27 DIAGNOSIS — K42.9 UMBILICAL HERNIA WITHOUT OBSTRUCTION AND WITHOUT GANGRENE: Primary | ICD-10-CM

## 2024-06-28 ENCOUNTER — OFFICE VISIT (OUTPATIENT)
Dept: NEUROSURGERY | Facility: CLINIC | Age: 49
End: 2024-06-28
Attending: NEUROLOGICAL SURGERY
Payer: COMMERCIAL

## 2024-06-28 VITALS
SYSTOLIC BLOOD PRESSURE: 129 MMHG | RESPIRATION RATE: 16 BRPM | HEART RATE: 69 BPM | OXYGEN SATURATION: 97 % | DIASTOLIC BLOOD PRESSURE: 68 MMHG

## 2024-06-28 DIAGNOSIS — A17.0 TUBERCULOUS MENINGITIS: ICD-10-CM

## 2024-06-28 DIAGNOSIS — D35.2 PITUITARY ADENOMA (H): Primary | ICD-10-CM

## 2024-06-28 DIAGNOSIS — G93.9 BRAIN LESION: ICD-10-CM

## 2024-06-28 PROCEDURE — T1013 SIGN LANG/ORAL INTERPRETER: HCPCS | Mod: U3 | Performed by: PHYSICIAN ASSISTANT

## 2024-06-28 PROCEDURE — 99213 OFFICE O/P EST LOW 20 MIN: CPT | Performed by: PHYSICIAN ASSISTANT

## 2024-06-28 ASSESSMENT — PAIN SCALES - GENERAL: PAINLEVEL: NO PAIN (0)

## 2024-06-28 NOTE — PROGRESS NOTES
[facility]  Department of Neurosurgery  Center for Skull Base and Pituitary Surgery    Name: Manuela Osman  MRN: [id number]  Age: 49 year old  : 2024      Chief Complaint:   Pituitary stalk region mass, follow up visit    History of Present Illness:   Manuela Osman is a 49 year old female with a history of TB meningitis in  involving the basal cisterns. On 2020, patient presented to [facility] with fever to 103, sore throat, weakness, dizziness, and neck stiffness. She went to the ED because she was having COVID like symptoms and was dehydrated. AFB culture was positive for tuberculosis at that time. She was evaluated by Dr. Kwon initially 3/27/2024 for a pituitary stalk lesion. Since that time, she's had a workup by Infectious Disease which was negative. Close follow up was recommended with MRI for which she's here today. She's accompanied by an in person [facility]  today. She reports bilateral leg pain which is chronic; she otherwise denies new symptoms. She was seen at [facility] Emergency Department 2024 for headache and MRI brain was done at that time which showed a stable stalk lesion. She has not had an updated MRI since then.     Review of Systems:   Pertinent items are noted in HPI or as in patient entered ROS below, remainder of complete ROS is negative.     Physical Exam:   /68 (BP Location: Left arm, Patient Position: Sitting)   Pulse 69   Resp 16   SpO2 97%    General: No acute distress.    Eyes: Conjunctivae are normal.  MSK: Moves all extremities.  No obvious deformity.  Neuro: The patient is fully oriented. Speech is normal. Facial nerve function is normal, rated as a House Brackmann 1. Gait is normal.   Psych: Normal mood and affect. Behavior is normal.      Imaging:  We reviewed the imaging from her previous visit along with the MRI done 2024 at an outside Emergency Department; this shows a stable pituitary stalk  lesion without evidence of growth.      Assessment:  Pituitary stalk region mass, follow up visit    Plan:  We discussed the importance of following her pituitary stalk mass; the patient was initially hesitant to get any more MRI's but after discussion agreed to follow up in 2-3 months with a repeat scan to ensure stability. Will also review with Dr. Kwon. I encouraged her to follow up with Primary Care regarding her bilateral leg pain; it appears that she was seen in the past by Vascular Surgery as well.       Alexandra Toribio PA-C  Department of Neurosurgery

## 2024-06-28 NOTE — PROGRESS NOTES
[facility]  Department of Neurosurgery  Center for Skull Base and Pituitary Surgery    Name: Manuela Osman  MRN: [id number]  Age: 49 year old  : 2024      Chief Complaint:   ***    History of Present Illness:   Manuela Osman is a 49 year old female with a history of *** who is seen today for ***      Review of Systems:   Pertinent items are noted in HPI or as in patient entered ROS below, remainder of complete ROS is negative.     Physical Exam:   /68 (BP Location: Left arm, Patient Position: Sitting)   Pulse 69   Resp 16   SpO2 97%    General: No acute distress.    Eyes: Conjunctivae are normal.  MSK: Moves all extremities.  No obvious deformity.  Neuro: The patient is fully oriented. Speech is normal. Extraocular movements are intact without nystagmus. Facial sensation is intact in V1, V2, V3 distributions. Facial nerve function is normal, rated as a House Brackmann ***. Gait is normal.   Psych: Normal mood and affect. Behavior is normal.      Imaging:  ***     Assessment:  ***    Plan:  ***       Alexandra Toribio PA-C  Department of Neurosurgery

## 2024-07-06 ENCOUNTER — HEALTH MAINTENANCE LETTER (OUTPATIENT)
Age: 49
End: 2024-07-06

## 2024-07-09 ENCOUNTER — TELEPHONE (OUTPATIENT)
Dept: NEUROSURGERY | Facility: CLINIC | Age: 49
End: 2024-07-09
Payer: COMMERCIAL

## 2024-07-09 NOTE — TELEPHONE ENCOUNTER
Left Voicemail (1st Attempt) for the patient to call back and schedule the following:    Appointment type: return skull base  Provider: Alexandra Toribio  Return date: Around September 11th-13th  Specialty phone number: [phone]  Additional appointment(s) needed: MRI prior  Additonal Notes: Sched MRI and clinic visit same day.

## 2024-07-26 LAB
ACID FAST STAIN (ARUP): NORMAL

## 2024-07-29 ENCOUNTER — PRE VISIT (OUTPATIENT)
Dept: SURGERY | Facility: CLINIC | Age: 49
End: 2024-07-29
Payer: COMMERCIAL

## 2024-07-29 NOTE — TELEPHONE ENCOUNTER
Patient Telephone Reminder Call    Date of call:  07/29/24  Phone numbers:  Cell number on file:    Telephone Information:   Mobile [phone]       Reached patient/confirmed appointment:  Yes  Appointment with:   Dr. Sahil Perkins  Reason for visit:  umbilical hernia  Remind patient that this visit is a consultation only, NO procedure:  Yes  Can this visit be changed to a video visit:  No

## 2024-07-30 ENCOUNTER — TELEPHONE (OUTPATIENT)
Dept: SURGERY | Facility: CLINIC | Age: 49
End: 2024-07-30

## 2024-07-30 ENCOUNTER — PRE VISIT (OUTPATIENT)
Dept: SURGERY | Facility: CLINIC | Age: 49
End: 2024-07-30

## 2024-07-30 ENCOUNTER — OFFICE VISIT (OUTPATIENT)
Dept: SURGERY | Facility: CLINIC | Age: 49
End: 2024-07-30
Payer: COMMERCIAL

## 2024-07-30 VITALS
HEART RATE: 70 BPM | HEIGHT: 65 IN | WEIGHT: 176.4 LBS | SYSTOLIC BLOOD PRESSURE: 131 MMHG | BODY MASS INDEX: 29.39 KG/M2 | OXYGEN SATURATION: 100 % | DIASTOLIC BLOOD PRESSURE: 74 MMHG

## 2024-07-30 DIAGNOSIS — K42.9 UMBILICAL HERNIA WITHOUT OBSTRUCTION AND WITHOUT GANGRENE: ICD-10-CM

## 2024-07-30 PROCEDURE — 99203 OFFICE O/P NEW LOW 30 MIN: CPT | Performed by: SURGERY

## 2024-07-30 RX ORDER — CEFAZOLIN SODIUM 2 G/50ML
2 SOLUTION INTRAVENOUS SEE ADMIN INSTRUCTIONS
OUTPATIENT
Start: 2024-07-30

## 2024-07-30 RX ORDER — CEFAZOLIN SODIUM 2 G/50ML
2 SOLUTION INTRAVENOUS
OUTPATIENT
Start: 2024-07-30

## 2024-07-30 ASSESSMENT — PAIN SCALES - GENERAL: PAINLEVEL: NO PAIN (0)

## 2024-07-30 NOTE — TELEPHONE ENCOUNTER
Patient is scheduled for surgery with Dr. Perkins    Spoke with: Manuela    Date of Surgery: 9/6/2024    Location: [facility]    Informed patient they will need an adult : Yes    Pre op with Provider n/a    H&P: Scheduled with PAC on 8/19/2024 at 8:00am    Additional imaging/appointments: n/a    Surgery packet: To be sent via [employer]     Additional comments: n/a        Sushma Mcneil on 7/30/2024 at 3:39 PM

## 2024-07-30 NOTE — NURSING NOTE
Pre and Post op Patient Education/Teaching Flowsheet  Relevant Diagnosis:  Umbilical Hernia (K42.0)  Teaching Topic:  Pre and post op teaching  Person(s) Involved in teaching:  Patient and Daughter     Motivation Level:  Asks Questions:  Yes  Eager to Learn:  Yes  Cooperative:  Yes  Receptive (willing/able to accept information):  Yes  Any cultural factors/Sabianist beliefs that may influence understanding or compliance?  No    Patient/caregiver/family demonstrates understanding of the following:  Reason for the appointment, diagnosis, and treatment plan:  Yes  Patient demonstrates understanding of the following:  Pre-op bowel prep:  N/A  Post-op pain management recommendations (medications, ice compress, binder/athletic supporter (if applicable), etc.:  Yes  Inguinal hernia patients:  Post-op urinary retention- discussed signs/symptoms and visit to ER for Webb catheter placement and to stay in place for at least 48 hours:  NA  Restrictions:  Yes  Medications to take the day of surgery:  Per PCP  Blood thinner medications discussed and when to stop (if applicable):  Yes  Wound care:  Yes  Diabetes medication management (if applicable):  Per PCP  Which situations necessitate calling provider and whom to contact:  Discussed how to contact the hospital, nurse, and clinic scheduling staff if necessary    Infection Prevention: Patient demonstrates understanding of the following:  Patient instructed on hand hygiene:  Yes  Surgical procedure site care will be taught and will be reviewed at the time of discharge  Signs and symptoms of infection taught:  Yes  Wound care reviewed and will be taught at the time of discharge  Central venous catheter care will be taught at the time of discharge (if applicable)    Post-op follow-up:  Instructional materials used/given/mailed:  Surgical logistics, post op teaching sheet, and surgical scrub    Logistics:  Date and time of surgery:  TBD  Location of surgery: [facility]- 5th [facility]  History and Physical and any other testing necessary prior to surgery:  Yes  Required time line for completion of History and Physical and any pre-op testing:  Yes  Discuss need for someone to drive patient home and stay with them for 24 hours:  Yes  Pre-op showering/scrub information with Surgical Scrub:  Yes  NPO Guidelines:  NPO per Anesthesia Guidelines

## 2024-07-30 NOTE — LETTER
2024       RE: Manuela Osman  [address]  [place] [facility] [place] [facility] [address]       Dear Colleague,    Thank you for referring your patient, Manuela Osman, to the [facility]. Please see a copy of my visit note below.    Manuela Osman is a 49 year old female with a 2 year history of an umbilical hernia with the following symptoms of lump that more recently has become uncomfortable.      Obstructive symptoms:  no  Urinary difficulties:  no  Chronic cough: no  Constipation:  no    Past medical history, medications, allergies, family history, and social history were reviewed with the patient.    Past Medical History:   Diagnosis Date    Menopause present     Per pt Early menopause for 3 years     Past Surgical History:   Procedure Laterality Date     SECTION      SHOULDER SURGERY         ROS: 10 point review of systems negative except noted in HPI   PHYSICAL EXAM  General appearance- healthy, alert, and in no distress.  Skin- Skin color and turgor normal.  No obvious rashes, argelia.  Lungs- Respiratory effort unlabored.  Gait- Normal.  Abdomen - soft non distended, non tender umbilical hernia.    Impression:  Hernia, umbilical  Recommendation:  open mesh repair umbilical hernia.    A full discussion regarding the alternatives, risks, goals, and potential complications for this surgery was completed today.  The patient understood that the potential problems included but are not limited to:  Infection, bleeding, hematoma, seroma, and recurrence.    The patient verbally expressed understanding, was given the opportunity for questions, and gives full informed consent for the procedure.      Today the patient was instructed on the need for a preoperative H&P, NPO status prior to surgery, and the need to stop anticoagulants prior to surgery.  Additional educational material, soap, and instructions will be  mailed out to the patients home.    The total time spent with this patient and her daughter was 30 minutes.  The total time was spent on the date of encounter doing chart review, history and physical, dressing changes, documentation, patient education, and any further activity as noted above.           Again, thank you for allowing me to participate in the care of your patient.      Sincerely,    Sahil Perkins MD

## 2024-07-30 NOTE — NURSING NOTE
"Chief Complaint   Patient presents with    New Patient     Umbilical hernia       Vitals:    07/30/24 1028   BP: 131/74   BP Location: Right arm   Patient Position: Sitting   Cuff Size: Adult Regular   Pulse: 70   SpO2: 100%   Weight: 80 kg (176 lb 6.4 oz)   Height: 1.651 m (5' 5\")       Body mass index is 29.35 kg/m .                          Reyes Yu EMT    "

## 2024-07-30 NOTE — PROGRESS NOTES
Manuela Osman is a 49 year old female with a 2 year history of an umbilical hernia with the following symptoms of lump that more recently has become uncomfortable.      Obstructive symptoms:  no  Urinary difficulties:  no  Chronic cough: no  Constipation:  no    Past medical history, medications, allergies, family history, and social history were reviewed with the patient.    Past Medical History:   Diagnosis Date    Menopause present     Per pt Early menopause for 3 years     Past Surgical History:   Procedure Laterality Date     SECTION      SHOULDER SURGERY         ROS: 10 point review of systems negative except noted in HPI   PHYSICAL EXAM  General appearance- healthy, alert, and in no distress.  Skin- Skin color and turgor normal.  No obvious rashes, argelia.  Lungs- Respiratory effort unlabored.  Gait- Normal.  Abdomen - soft non distended, non tender umbilical hernia.    Impression:  Hernia, umbilical  Recommendation:  open mesh repair umbilical hernia.    A full discussion regarding the alternatives, risks, goals, and potential complications for this surgery was completed today.  The patient understood that the potential problems included but are not limited to:  Infection, bleeding, hematoma, seroma, and recurrence.    The patient verbally expressed understanding, was given the opportunity for questions, and gives full informed consent for the procedure.      Today the patient was instructed on the need for a preoperative H&P, NPO status prior to surgery, and the need to stop anticoagulants prior to surgery.  Additional educational material, soap, and instructions will be mailed out to the patients home.    The total time spent with this patient and her daughter was 30 minutes.  The total time was spent on the date of encounter doing chart review, history and physical, dressing changes, documentation, patient education, and any further activity as noted above.

## 2024-07-30 NOTE — PATIENT INSTRUCTIONS
You met with Dr. Sahil Perkins.      Today's visit instructions:    Reminder:  Surgery Requirements  Your surgery will be at [facility] [place]- 5th Floor.  You will need to arrive 1.5  hours early.  You will need someone to drive you home (over 18 years old) and stay with you for 24 hours after the procedure.  You will need a preop physical with [facility] ([facility] within 30 days of surgery- closer is always better.  Stop any blood thinners, vitamins, minerals, or herbal supplements 5 days before surgery.  If you are taking a prescribed blood thinner or weight loss medication please let us know for specific instructions.  Fasting- a nurse from [facility] will call you 1-2 days before surgery to confirm your procedure and tell you when to stop eating and drinking.   Wash with the soap given/mailed from the clinic the night before surgery and morning of surgery. See instructions in the Surgery Packet.  If you would like a procedure estimate please call Cost of Care at [phone] during the hours of 8 AM - 3 PM (option #2 for on-line request and option #3 for a representative).        If you have questions please contact Kathy RN or Kemi RN during regular clinic hours, Monday through Friday 7:30 AM - 4:00 PM, or you can contact us via Check-Cap at anytime.       If you have urgent needs after-hours, weekends, or holidays please call the hospital at [phone] and ask to speak with our on-call General Surgery Team.    Appointment [phone]  Nurse Advice (Kathy or Kemi): [phone]   Surgery Scheduler (Sushma): [phone]  Fax: [phone]    After Your Open Umbilical Hernia Repair          Incision care   You may take a shower the day after surgery. Carefully wash your incision with soap and water. Do not submerge yourself in water (bath, whirlpool, hot tub, pool, lake) for 14 days after surgery. Always wash your hands before touching your  incisions or removing bandages.  Remove the bandage 1-2 days after surgery, but leave the medical tape (Steri-Strips) or glue in place. These will loosen and fall off on their own 1-2 weeks after surgery.     It is not unusual to form a collection of fluid or blood under your incision that may feel firm or squishy- it can take several weeks to months for your body to reabsorb it.  At times, it may even drain.  If that should happen keep the area clean with soap, water,  and cover with a clean gauze dressing. You can change this daily or as needed.      Other medicines   Wait to start aspirin or blood thinners until the day after surgery. You can continue your regular medicines at your normal time the day after surgery.   Your pain medicine may cause constipation (hard, dry stools). To help with this, take the stool softener your doctor gave you or an over-the-counter stool softener or laxative. You can stop taking this when you are no longer taking pain medicine and your bowel movements are back to normal.      For pain or discomfort   Take the narcotic pain medicine your doctor gave you as needed and as instructed on the bottle. If you prefer to use over-the-counter medication, use acetaminophen (Tylenol) or ibuprofen (Advil, Motrin) as instructed on the box. Do not take Tylenol if it is in your narcotic pain medication.   Use an ice pack on your incision (surgical cut) for 20 minutes at a time as needed for the first 24 hours. Be sure to protect your skin by putting a cloth between the ice pack and your skin.   After 24 hours you can switch to heat for 20 minutes as needed. Be sure to protect your skin by putting a cloth between the heat pack and your skin.         Activities   No driving until you feel it s safe to do so. Don t drive while taking narcotic pain medicine.   Don t lift anything heavier than 20 pounds for 3 to 4 weeks after surgery.      Special equipment   If we gave you an abdominal binder, wear it  for the first 30 days after surgery. You don t have to wear it when you re sleeping. You can wear it longer than 30 days if you wish.      Diet   You can eat your regular meals after surgery.     Dental Care  Please avoid dental care for two weeks prior to hernia repair and for 6 weeks after surgery due to the mesh that may be placed.      When to call the doctor   Call your doctor if you have:   A fever above 101 F (38.3 C) (taken under the tongue), or a fever or chills lasting more than a day.   Redness at the incision site.   Any fluid or blood draining from the incision, especially if it smells bad.    Severe pain that doesn t improve with pain medicine.      We will call you 2 to 4 days after surgery to review this handout, answer questions and help arrange after-surgery care. If you have questions or concerns, please call [phone] during regular office hours. If you need to call after business hours, call [phone] and ask to page the surgeon on-call.    IMPORTANT:  Prior to your surgical procedure, a nurse will be contacting you to obtain a health history.   If they do not reach you by noon the day prior to your surgery, your surgery will be cancelled. If you have your Pre-Op Surgical Physical (H&P) with the Anesthesia Team ([facility]), you are exempt from this call. Phone:  [phone] ([facility]) or [phone] ([place])

## 2024-07-31 NOTE — TELEPHONE ENCOUNTER
FUTURE VISIT INFORMATION      SURGERY INFORMATION:  Date: 9/6/24  [facility]  Surgeon:  Sahil Perkins MD   Anesthesia Type:  mac with local  Procedure: HERNIORRHAPHY, UMBILICAL, OPEN   Consult: ov 7/30/24    RECORDS REQUESTED FROM:       Primary Care Provider: [facility]     Pertinent Medical History: hypertension    Most recent EKG+ Tracing: 3/10/23- [facility]

## 2024-08-12 NOTE — TELEPHONE ENCOUNTER
FUTURE VISIT INFORMATION        SURGERY INFORMATION:  Date: 9/6/24  [facility]  Surgeon:  Sahil Perkins MD   Anesthesia Type:  mac with local  Procedure: HERNIORRHAPHY, UMBILICAL, OPEN   Consult: ov 7/30/24     RECORDS REQUESTED FROM:         Primary Care Provider: [facility]      Pertinent Medical History: hypertension     Most recent EKG+ Tracing: 3/10/23- [facility]

## 2024-08-19 ENCOUNTER — PRE VISIT (OUTPATIENT)
Dept: SURGERY | Facility: CLINIC | Age: 49
End: 2024-08-19

## 2024-08-21 ENCOUNTER — PRE VISIT (OUTPATIENT)
Dept: SURGERY | Facility: CLINIC | Age: 49
End: 2024-08-21

## 2024-08-21 ENCOUNTER — TELEPHONE (OUTPATIENT)
Dept: SURGERY | Facility: CLINIC | Age: 49
End: 2024-08-21

## 2024-08-21 ENCOUNTER — OFFICE VISIT (OUTPATIENT)
Dept: SURGERY | Facility: CLINIC | Age: 49
End: 2024-08-21
Payer: COMMERCIAL

## 2024-08-21 VITALS
DIASTOLIC BLOOD PRESSURE: 63 MMHG | RESPIRATION RATE: 16 BRPM | TEMPERATURE: 98.1 F | HEART RATE: 69 BPM | SYSTOLIC BLOOD PRESSURE: 117 MMHG | WEIGHT: 177 LBS | OXYGEN SATURATION: 100 % | BODY MASS INDEX: 28.45 KG/M2 | HEIGHT: 66 IN

## 2024-08-21 DIAGNOSIS — E11.9 TYPE 2 DIABETES MELLITUS WITHOUT COMPLICATION, WITH LONG-TERM CURRENT USE OF INSULIN (H): ICD-10-CM

## 2024-08-21 DIAGNOSIS — K42.9 UMBILICAL HERNIA WITHOUT OBSTRUCTION AND WITHOUT GANGRENE: ICD-10-CM

## 2024-08-21 DIAGNOSIS — Z79.4 TYPE 2 DIABETES MELLITUS WITHOUT COMPLICATION, WITH LONG-TERM CURRENT USE OF INSULIN (H): ICD-10-CM

## 2024-08-21 DIAGNOSIS — Z01.818 PREOP EXAMINATION: Primary | ICD-10-CM

## 2024-08-21 PROCEDURE — T1013 SIGN LANG/ORAL INTERPRETER: HCPCS | Mod: U3

## 2024-08-21 PROCEDURE — 99204 OFFICE O/P NEW MOD 45 MIN: CPT | Performed by: NURSE PRACTITIONER

## 2024-08-21 RX ORDER — FEXOFENADINE HCL 180 MG/1
1 TABLET ORAL EVERY MORNING
COMMUNITY
Start: 2024-08-15

## 2024-08-21 ASSESSMENT — LIFESTYLE VARIABLES: TOBACCO_USE: 0

## 2024-08-21 ASSESSMENT — ENCOUNTER SYMPTOMS: SEIZURES: 0

## 2024-08-21 ASSESSMENT — PAIN SCALES - GENERAL: PAINLEVEL: NO PAIN (0)

## 2024-08-21 NOTE — PATIENT INSTRUCTIONS
Preparing for Your Surgery      Name:  Manuela Osman   MRN:  [id number]   :  1975   Today's Date:  2024         Arriving for surgery:  Surgery date:  24, Friday  Arrival time:  9.05AM    Restrictions due to COVID 19:    Please maintain social distance.  Masking is optional.      parking is available for anyone with mobility limitations or disabilities. (Monday- Friday 7 am- 5 pm)    Please come to:    [facility]  [address] [place] [id number]    Please check in on the 5th floor at the Ambulatory Surgery Center.      What can I eat or drink?    -  You may eat and drink normally until 8 hours prior to arrival  time. (Until 1.05AM)  -  You may have clear liquids until 2 hours prior to arrival  time. (Until 7.05AM)    Examples of clear liquids:  Water  Clear broth  Juices (apple, white grape, white cranberry  and cider) without pulp  Noncarbonated, powder based beverages  (lemonade and Chris-Aid)  Sodas (Sprite, 7-Up, ginger ale and seltzer)  Coffee or tea (without milk or cream)  Gatorade      Which medicines can I take?    Hold Aspirin for 7 days before surgery.   Hold Multivitamins for 7 days before surgery.  Hold Supplements for 7 days before surgery.  Hold Ibuprofen (Advil, Motrin) for 1 day before surgery--unless otherwise directed by surgeon.  Hold Naproxen (Aleve) for 4 days before surgery.    No alcohol or cannabis products for 24 hours prior to procedure.    Hold Farxiga and Jardiance for 3 days before surgery.  Take only 30 units of Tresiba the morning of your surgery.      -  DO NOT take the following medications the day of surgery:  Aspirin  Farxiga  Jardiance  Calcium Carb - cholecalciferol  Vitamin D3  Losartan (Cozaar)  Insulin Novolog  Metformin    -  PLEASE TAKE the following medications the day of surgery:   Famotidine (Pepcid)  Lipitor  Gabapentin    How do I prepare myself?  - Please take 2 showers (one the night prior to surgery and one  the morning of surgery) using Scrubcare or Hibiclens soap.    Use this soap only from the neck to your toes.     Leave the soap on your skin for one minute--then rinse thoroughly.      You may use your own shampoo and conditioner. No other hair products.   - Please remove all jewelry and body piercings.  - No lotions, deodorants or fragrance.  - No makeup or fingernail polish.   - Bring your ID and insurance card.    -If you have a Deep Brain Stimulator, a Spinal Cord Stimulator, or any implanted Neuro Device, you must bring the remote to the Surgery Center.         ALL PATIENTS ARE REQUIRED TO HAVE A RESPONSIBLE ADULT TO DRIVE AND BE IN ATTENDANCE WITH THEM FOR 24 HOURS FOLLOWING SURGERY.     Covid testing policy as of 12/06/2022  Your surgeon will notify and schedule you for a COVID test if one is needed before surgery--please direct any questions or COVID symptoms to your surgeon      Questions or Concerns:    -For questions regarding the day of surgery, please contact the Ambulatory Surgery Center at [phone].    -If you have health changes between today and your surgery, please contact your surgeon.     - For questions after surgery, please contact your surgeon's office.

## 2024-08-21 NOTE — H&P
Pre-Operative H & P     CC:  Preoperative exam to assess for increased cardiopulmonary risk while undergoing surgery and anesthesia.    Date of Encounter: 8/21/2024  Primary Care Physician:  [facility]     Reason for visit:   Encounter Diagnoses   Name Primary?    Preop examination Yes    Umbilical hernia without obstruction and without gangrene     Type 2 diabetes mellitus without complication, with long-term current use of insulin (H)        HPI  Manuela Osman is a 49 year old female who presents for pre-operative H & P in preparation for  Procedure Information       Case: [id number] Date/Time: 09/06/24 1035    Procedure: HERNIORRHAPHY, UMBILICAL, OPEN (Umbilicus)    Anesthesia type: MAC with Local    Diagnosis: Umbilical hernia without obstruction and without gangrene [K42.9]    Pre-op diagnosis: Umbilical hernia without obstruction and without gangrene [K42.9]    Location: [place] [address] / [facility]    Providers: Sahil Perkins MD            The patient presents to the PAC in person today with her daughter, Farheen, and Czech  in preparation for the above scheduled procedure with comorbid conditions including HTN, pituitary stalk region mass, h/o TB meningitis and basl cistern meningitis, DMII, GERd and allergic rhinitis     The patient was seen by Dr. Perkins in surgical consultation for a  2 year history of an umbilical hernia with the following symptoms of lump that more recently has become uncomfortable.  Dr. Perkins counseled the patient of the findings and treatment options.  The patient has now been scheduled for the procedure as listed above.        History is obtained from the patient through a Czech  and chart review    Hx of abnormal bleeding or anti-platelet use: denies    Menstrual history: No LMP recorded. Patient is postmenopausal.:      Past Medical History  Past Medical History:   Diagnosis Date    Menopause  "present     Per pt Early menopause for 3 years       Past Surgical History  Past Surgical History:   Procedure Laterality Date     SECTION      SHOULDER SURGERY         Prior to Admission Medications  Current Outpatient Medications   Medication Sig Dispense Refill    acetaminophen (TYLENOL) 500 MG tablet Take 1-2 tablets (500-1,000 mg) by mouth every 6 hours as needed for mild pain 100 tablet 1    aspirin 81 MG EC tablet Take 81 mg by mouth at bedtime. Per pt she takes baba ASA.      atorvastatin (LIPITOR) 20 MG tablet Take 40 mg by mouth every morning.      Calcium Carb-Cholecalciferol 500-10 MG-MCG TABS Take 1 tablet by mouth every morning.      empagliflozin (JARDIANCE) 10 MG TABS tablet Take 1 tablet by mouth every morning.      famotidine (PEPCID) 20 MG tablet Take 20 mg by mouth daily (with breakfast).      fexofenadine (ALLEGRA) 180 MG tablet Take 1 tablet by mouth every morning.      fluticasone (FLONASE) 50 MCG/ACT nasal spray Spray 1 spray into both nostrils as needed.      gabapentin (NEURONTIN) 300 MG capsule Take 400 mg by mouth 3 times daily.      losartan (COZAAR) 25 MG tablet Take 25 mg by mouth at bedtime.      metFORMIN (GLUCOPHAGE XR) 500 MG 24 hr tablet Take 500 mg by mouth daily (with breakfast).      NOVOLOG FLEXPEN 100 UNIT/ML soln Inject 8 Units subcutaneously 2 times daily (with meals).      TRESIBA FLEXTOUCH 100 UNIT/ML pen Inject 40 Units subcutaneously daily (with lunch).      GOODSENSE PAIN RELIEF EXTRA  MG tablet          Allergies  Allergies   Allergen Reactions    Lisinopril Cough and Itching     Other reaction(s): Other (see comments)    Ibuprofen Other (See Comments)    Loratadine Itching     Other reaction(s): Other (see comments), Runny Nose    No Clinical Screening - See Comments      Other reaction(s): Unknown  \"allergy to something given during surgery for \"     Perfume Other (See Comments)     Sneezing and congestion  Other reaction(s): Other - " Describe In Comment Field  Sneezing and congestion         Social History  Social History     Socioeconomic History    Marital status:      Spouse name: Not on file    Number of children: Not on file    Years of education: Not on file    Highest education level: Not on file   Occupational History    Not on file   Tobacco Use    Smoking status: Never    Smokeless tobacco: Never   Vaping Use    Vaping status: Never Used   Substance and Sexual Activity    Alcohol use: Never    Drug use: Never    Sexual activity: Not on file   Other Topics Concern    Parent/sibling w/ CABG, MI or angioplasty before 65F 55M? Not Asked   Social History Narrative    Not on file     Social Determinants of Health     Financial Resource Strain: Not at Risk (3/12/2024)    Received from [facility]    Financial Resource Strain     Financial Resource Strain: 1   Food Insecurity: Not at Risk (3/12/2024)    Received from [facility]    Food Insecurity     Food: 1   Transportation Needs: Not at Risk (3/12/2024)    [facility] from [facility]    Transportation Needs     Transportation: 1   Physical Activity: Not on File (3/12/2024)    [facility] from [facility]    Physical Activity     Physical Activity: 0   Stress: Not at Risk (3/12/2024)    Received from [facility]    Stress     Stress: 1   Social Connections: Not at Risk (3/12/2024)    Received from [facility]    Social Connections     Social Connections and Isolation: 1   Interpersonal Safety: Not on file   Housing Stability: Not at Risk (3/12/2024)    Received from [facility]    Housing Stability     Housin       Family History  Family History   Problem Relation Age of Onset    Glaucoma No family hx of     Macular Degeneration No family hx of        Review of Systems  The complete review of systems is negative other than noted in the HPI or here.   Anesthesia Evaluation   Pt has had prior anesthetic. Type: MAC and General.    No history of anesthetic complications  "      ROS/MED HX  ENT/Pulmonary:     (+)     CINTHYA risk factors,  hypertension,    allergic rhinitis,                          (-) tobacco use   Neurologic: Comment: Pituitary stalk region mass, history of TB meningitis and basal cistern meningitis    (+)    peripheral neuropathy,                         (-) no seizures, no CVA and no TIA   Cardiovascular: Comment: Denies cardiac symptoms including chest pain, SOB, palpitations, syncope, POLANCO, orthopnea, or PND.        (+) Dyslipidemia hypertension- -   -  - -   Taking blood thinners                              Previous cardiac testing   Echo: Date: Results:    Stress Test:  Date: 3/2023 Results:  SINUS RHYTHM   NONSPECIFIC T-WAVE ABNORMALITY   BORDERLINE ECG   Comparison Summary: No Significant Change since last ECG.   Compared with: 3/30/2016 5:33 PM     ECG Reviewed:  Date: Results:    Cath:  Date: Results:      METS/Exercise Tolerance: >4 METS Comment: Walks for exercise daily.    Hematologic:  - neg hematologic  ROS  (-) history of blood clots, anemia and history of blood transfusion   Musculoskeletal: Comment: S/p right shoulder arthroscopy.       GI/Hepatic: Comment: Umbilical hernia    (+) GERD, Asymptomatic on medication,               (-) liver disease   Renal/Genitourinary:     (+) renal disease, type: CRI,            Endo: Comment: A1C 9.2    (+) type I DM,                  (-) thyroid disease, chronic steroid usage and obesity   Psychiatric/Substance Use:    (-) psychiatric history, alcohol abuse history and chronic opioid use history   Infectious Disease:     (+)      TB (s/p treatment.  followed up with ID May 2024.),      Malignancy:  - neg malignancy ROS     Other:  - neg other ROS          /63 (BP Location: Right arm, Patient Position: Sitting, Cuff Size: Adult Large)   Pulse 69   Temp 98.1  F (36.7  C) (Oral)   Resp 16   Ht 1.676 m (5' 6\")   Wt 80.3 kg (177 lb)   SpO2 100%   Breastfeeding No   BMI 28.57 kg/m      Physical Exam "   Constitutional: Awake, alert, cooperative, no apparent distress, and appears stated age.  Eyes: Pupils equal, round and reactive to light, extra ocular muscles intact, sclera clear, conjunctiva normal.  HENT: Normocephalic, oral pharynx with moist mucus membranes, good dentition. No goiter appreciated.   Respiratory: Clear to auscultation bilaterally, no crackles or wheezing.  Cardiovascular: Regular rate and rhythm, normal S1 and S2, and no murmur noted.  Carotids +2, no bruits. No edema. Palpable pulses to radial  DP and PT arteries.   GI: Normal bowel sounds, soft, non-distended, non-tender, no masses palpated, no hepatosplenomegaly.  Lymph/Hematologic: No cervical lymphadenopathy and no supraclavicular lymphadenopathy.    Skin: Warm and dry.  No rashes at anticipated surgical site.   Musculoskeletal: Full ROM of neck. There is no redness, warmth, or swelling of the joints. Gross motor strength is normal.    Neurologic: Awake, alert, oriented to name, place and time. Cranial nerves II-XII are grossly intact. Gait is normal.   Neuropsychiatric: Calm, cooperative. Normal affect.     Prior Labs/Diagnostic Studies   All labs and imaging personally reviewed   A1C, ALERE AFINION (POCT)  Specimen: Blood - Blood (substance)  Component  Ref Range & Units 1 mo ago   HGB A1C  4.0 - 6.0 % 9.2 Abnormal    [facility] IN-HOUSE   Specimen Collected: 06/25/24  2:07 PM     Recent Data from [facility]  Related to A1C, ALERE AFINION (POCT)  Component 06/25/24 02/22/24 11/02/23 07/25/23 03/29/23 12/06/22   HGB A1C 9.2 Abnormal  8.7 Abnormal  8.9 Abnormal  9.7 Abnormal  9.2 Abnormal  8.4 Abnormal      BASIC METABOLIC PANEL  Specimen: Blood - Blood specimen (specimen)  Component  Ref Range & Units 2 mo ago Comments   SODIUM  136 - 145 mmol/L 141    POTASSIUM  3.5 - 5.1 mmol/L 5.3 High     CHLORIDE  98 - 107 mmol/L 107    CO2,TOTAL  22 - 29 mmol/L 24    ANION GAP  5 - 18 10    GLUCOSE  70 - 99 mg/dL 142 High      CALCIUM  8.6 - 10.0 mg/dL 9.4    BUN  6 - 20 mg/dL 17    CREATININE  0.50 - 0.90 mg/dL 0.89    BUN/CREAT RATIO  10 - 20 19    eGFR  >90 mL/min/1.73m2 79 Low  As of 03/15/2022, eGFR is calculated by the CKD-EPI creatinine equation without race adjustment.  eGFR can be influenced by muscle mass, exercise, and diet.  The reported eGFR is an estimation only and is only applicable if the renal function is stable.   Resulting Agency [facility] LABORATORY-CENTRAL LABORATORY    Specimen Collected: 06/18/24  9:40 AM     HEPATIC FUNCTION PANEL  Specimen: Blood - Blood specimen (specimen)  Component  Ref Range & Units 2 mo ago Comments   ALBUMIN  4.0 - 4.9 g/dL 4.0    PROTEIN,TOTAL  6.0 - 8.0 g/dL 7.0    BILIRUBIN,TOTAL  0.0 - 1.2 mg/dL 0.3    BILIRUBIN,DIRECT  0.0 - 0.3 mg/dL <0.2    BILIRUBIN,INDIRECT  Unable to calculate, Direct Bili <0.2   ALK PHOSPHATASE  35 - 104 IU/L 70    ALT (SGPT)  10 - 35 IU/L 12    AST (SGOT)  10 - 35 IU/L 26    Resulting [facility] LABORATORY-CENTRAL LABORATORY    Specimen Collected: 06/18/24  9:40 AM     CBC WITH AUTO DIFFERENTIAL  Specimen: Blood - Blood specimen (specimen)  Component  Ref Range & Units 2 mo ago   WHITE BLOOD COUNT          4.5 - 11.0 thou/cu mm 5.9   RED BLOOD COUNT            4.00 - 5.20 mil/cu mm 4.63   HEMOGLOBIN                12.0 - 16.0 g/dL 13.9   HEMATOCRIT                33.0 - 51.0 % 42.4   MCV                        80 - 100 fL 92   MCH                        26.0 - 34.0 pg 30.0   MCHC                      32.0 - 36.0 g/dL 32.8   RDW                        11.5 - 15.5 % 13.1   PLATELET COUNT            140 - 440 thou/cu mm 208   MPV                        6.5 - 11.0 fL 10.7   NRBC                      % 0.0   ABS NRBC  thou /cu mm 0.0   % NEUT  % 52.4   % LYMPH  % 38.2   % MONO  % 6.8   % EOS  % 2.0   % BASO  % 0.3   % IMMATURE GRAN (METAS,MYELOS,PROS)  % 0.3   ABSOLUTE NEUTROPHILS      1.7 - 7.0 thou/cu mm 3.1   ABSOLUTE LYMPHOCYTES      0.9 - 2.9  thou/cu mm 2.3   ABSOLUTE MONOCYTES        <0.9 thou/cu mm 0.4   ABSOLUTE EOSINOPHILS      <0.5 thou/cu mm 0.1   ABSOLUTE BASOPHILS        <0.3 thou/cu mm 0.0   ABSOLUTE IMMATURE GRANULOCYTES(METAS,MYELOS,PROS)  <0.3 thou/cu mm 0.0   Resulting Agency [facility] LABORATORY-CENTRAL LABORATORY   Specimen Collected: 06/18/24  9:40 AM       EKG/ stress test - if available please see in ROS above   No results found.       No data to display                  The patient's records and results personally reviewed by this provider.     Outside records reviewed from: Care Everywhere    LAB/DIAGNOSTIC STUDIES TODAY:  not indicated    Assessment    Manuela Osman is a 49 year old female seen as a PAC referral for risk assessment and optimization for anesthesia.    Plan/Recommendations  Pt will be optimized for the proposed procedure.  See below for details on the assessment, risk, and preoperative recommendations    NEUROLOGY  - Pituitary stalk region mass, history of TB meningitis and basal cistern meningitis   ~ follows with Dr. Kwon   ~ followed with imaging (MRI 6/2024)    - No history of TIA, CVA or seizure    - Neuropathy   ~ gabapentin    -Post Op delirium risk factors:  No risk identified    ENT  - No current airway concerns.  Will need to be reassessed day of surgery.  Mallampati: II  TM: > 3    CARDIAC  - No history of CAD and Afib    -  Denies cardiac symptoms.    - HTN   ~ stable on losartan    - HLD   ~ statin     - METS (Metabolic Equivalents)  Patient performs 4 or more METS exercise without symptoms             Total Score: 0      RCRI-Very low risk: Class 1 0.4% complication rate             Total Score: 0        PULMONARY  - CINTHYA Low Risk             Total Score: 2    CINTHYA: Hypertension    CINTHYA: Over 50 ys old      - Denies asthma or inhaler use    - Allergic Rhinitis   ~ Fexofenadine and fluticasone     - Tobacco History    History   Smoking Status    Never   Smokeless Tobacco    Never       GI  -  "Umbilical hernia, symptomatic.   ~ above procedure scheduled   ~ given uncontrolled DM with A1C >8.0 for well over a year and most recent A1C >9.0, recommend delaying procedure until diabetes is under better control.  Notified surgeon's team of recommendation.     - GERD   ~ controlled on H2B    ~ continue H2B DOS    PONV High Risk  Total Score: 3           1 AN PONV: Pt is Female    1 AN PONV: Patient is not a current smoker    1 AN PONV: Intended Post Op Opioids        /RENAL  - CKD 2  ~ Creatinine/GFR see above   ~ Monitoring renal function during periop period.   ~ Avoidance of nephrotoxins as possible    ENDOCRINE    - BMI: Estimated body mass index is 28.57 kg/m  as calculated from the following:    Height as of this encounter: 1.676 m (5' 6\").    Weight as of this encounter: 80.3 kg (177 lb).  Overweight (BMI 25.0-29.9)    Diabetes, insulin dependent with A1C 9.2   ~ not well controlled A1C>8.0 for well over a year. Referral made to Diabetic nurse educator   ~ Diabetic medications per [facility] Preoperative guidelines   ~ Recommend close monitoring of the patient's blood glucose levels throughout the perioperative period and treat per [place] guidelines.      HEME  - VTE Low Risk 0.26%             Total Score: 0      - Platelet disfunction second to Aspirin (Morgan, many others)   ~ hold ASA for 7 days prior    - Denies a h/o anemia or previous blood transfusion      ID  - Followed up with Dr. Hernandez of ID for sellar lesion of unclear etiology.  CSF examination was within normal limits and not concerning for persistent TB. Records indicate that suspicion for an infectious etiology of the mass is low.     MSK  - s/p right shoulder arthroscopy    Patient is NOT Frail             Total Score: 0          Different anesthesia methods/types have been discussed with the patient, but they are aware that the final plan will be decided by the assigned anesthesia provider on the date of service.      AVS with " information on surgery time/arrival time, meds and NPO status given by nursing staff. No further diagnostic testing indicated.      On the day of service:     Prep time: 20 minutes  Visit time: 17 minutes  Documentation time: 20 minutes  ------------------------------------------  Total time: 57 minutes      RACHANA Barrera CNP  Preoperative Assessment Center  [facility]  Clinic and Surgery Center  Phone: [phone]  Fax: [phone]

## 2024-08-21 NOTE — TELEPHONE ENCOUNTER
Spoke with patients daughter and they are in agreement that patients diabetes is poorly controlled.  Umbilical hernia repair cancelled with Dr. Perkins.  Patient will need to achieve an A1c of 8 or less prior to rescheduling surgery.  They will contact this office when that has been achieved.

## 2024-08-21 NOTE — TELEPHONE ENCOUNTER
----- Message from Olinda Nur sent at 8/21/2024  9:31 AM CDT -----  Regarding: upcoming unbilical hernia repair with  uncontrolled DM >>A1C>9.0  Today I had the pleasure of seeing your patient, Ms. Osman and the PAC in person for her upcoming open umbilical hernia procedure on September 6, 2024 under MAC with local at the clinic and surgery center.    I wanted to bring to your attention, that her diabetes has not been well-controlled with her A1c being over 8.0 for well over a year.  In fact, her last A1c was 9.2.    It would be our recommendation that if this is an elective surgery, it be delayed until her diabetes mellitus is under better control with an A1C <8.0.  It sounds like her physician who managed her DM at the [facility] is no longer there.   I did make a referral to the diabetic nurse educator through joint decision-making with both the patient and her daughter.    Thank you for sending this delightful patient to the PAC.    Olinda Camargo

## 2024-09-05 ENCOUNTER — ALLIED HEALTH/NURSE VISIT (OUTPATIENT)
Dept: EDUCATION SERVICES | Facility: CLINIC | Age: 49
End: 2024-09-05
Attending: NURSE PRACTITIONER
Payer: COMMERCIAL

## 2024-09-05 DIAGNOSIS — E11.9 TYPE 2 DIABETES MELLITUS WITHOUT COMPLICATION, WITH LONG-TERM CURRENT USE OF INSULIN (H): ICD-10-CM

## 2024-09-05 DIAGNOSIS — Z79.4 TYPE 2 DIABETES MELLITUS WITHOUT COMPLICATION, WITH LONG-TERM CURRENT USE OF INSULIN (H): ICD-10-CM

## 2024-09-05 PROCEDURE — G0108 DIAB MANAGE TRN  PER INDIV: HCPCS

## 2024-09-05 NOTE — PROGRESS NOTES
"DIABETES SELF MANAGEMENT EDUCATION: Previous Diagnosis/Individual Diabetes Review    Manuela Osman presents today for education related to Type 2 diabetes.    She is accompanied by daughter  Referring Provider: Olinda REICH    DIABETES RELATED CONCERNS/COMMENTS: having trouble getting Cami sensors  Patient's emotional response to diabetes: expresses readiness to learn and unable to assess    Past Diabetes Education: No  Support system: family  Living Situation: lives with spouse/significant other and adult children  Occupation: not working outside the home    ASSESSMENT:  Patient Problem List and Medication List reviewed for relevant medical history, current medical status, and diabetes risk factors.    Current Diabetes Management per Patient:  Diabetes medications: YES, Jardiance 10 mg daily, Metformin 500 mg daily, Novolog 8 units 2 times daily, Tresiba 40 units daily (family helps to remember)  At risk for hypoglycemia? Yes, not often, lightheaded,sweaty,feels dehydrated   BG meter: Contour Next USB meter  Patient glucose self monitoring as follows: per Cami 3 sensor.         No results found for: \"A1C\"     Nutrition:  Breakfast: (8-9a) oatmeal,eggs and whole grain slices of toast, water  Snack:  Lunch:(2p)chicken salad, veggie sampler with savory whole grain pancake, brown rice and chicken and salad, water  Snack:   Dinner: ()  Snack: dates for low blood sugar treatment    Physical Activity:    Walks a lot, 10K a day, outdoors morning and afternoon    Diabetes Complications:  Not discussed today.    Diabetes knowledge and skills assessment:     Patient is knowledgeable in diabetes management concepts related to: Monitoring  Barriers to Learning Assessment: No Barriers identified    Based on learning assessment above, most appropriate setting for further diabetes education would be: Individual setting.    INTERVENTION:   Education provided today on:  AADE Self-Care Behaviors:  We discussed her current " Cami reports.  She has been without a sensor for a couple of days because her mail order sensors never got to her.  Some should arrive today.    Some teaching was done on a basal/bolus insulin regimen and how to assess whether doses need to be adjusted.  It is hard to determine now because insulin usage has been sporadic and the timing has not been right per daughter.  They also would like some help knowing what to eat.      They are asked to enter carb info and insulin info into Scayl 3 aron and return to see RD    Pt verbalized understanding of concepts discussed and recommendations provided today.       Education Materials Provided:  Carbohydrate Counting    PLAN:  Patient needs further education on the following diabetes management concepts: Healthy Eating, Problem Solving, Reducing Risks, and Healthy Coping    See Patient Instructions for co-developed, patient-stated behavior change goals.  AVS printed and provided to patient today.    FOLLOW-UP:    Follow-up appointment scheduled on 9/19/24.  Chart routed to referring provider.  Time Spent: 60 minutes  Encounter Type: Individual    Any diabetes medication initiation or dose changes were made via the CDCES Standing Orders per the patient's referring provider. A copy of this encounter was shared with the provider.

## 2024-09-05 NOTE — PATIENT INSTRUCTIONS
Wear your Cami 3 sensor as much as possible. Your blood sugar goals are: Before meals:  mg/dl  Try to limit the amount of carb in your meals.  Try to keep it to 3 servings.  Keep moving.  Your walking helps a lot.  Please start marking your insulin doses and food in Cami aron.  To do this go to logbook and add note.  Return to see Stephanie Beach RD 9/19/24 8:30 am    Soledad [place] [address]  Phone: [phone]  [email or web address]  Wednesday, Thursday, Friday 8a-4p

## 2024-09-11 DIAGNOSIS — E11.3212 MILD NONPROLIFERATIVE DIABETIC RETINOPATHY OF LEFT EYE WITH MACULAR EDEMA ASSOCIATED WITH TYPE 2 DIABETES MELLITUS (H): Primary | ICD-10-CM

## 2024-09-16 ENCOUNTER — TELEPHONE (OUTPATIENT)
Dept: EDUCATION SERVICES | Facility: CLINIC | Age: 49
End: 2024-09-16
Payer: COMMERCIAL

## 2024-09-16 ENCOUNTER — APPOINTMENT (OUTPATIENT)
Dept: INTERPRETER SERVICES | Facility: CLINIC | Age: 49
End: 2024-09-16
Payer: COMMERCIAL

## 2024-09-16 NOTE — TELEPHONE ENCOUNTER
Left Voicemail (1st Attempt) for the patient to call back and schedule the following:    Appointment type: Diabetes ed   Provider: Luiza Brown or Ingrid   Return date: re-suresh appt on 9/19 to next avail in person (appt scheduled in person on day Stephanie is only seeing pt's virtually)  Specialty phone number: [phone]  Additional appointment(s) needed:   Additonal Notes: LVM, MyC x1   Stephanie Beach, RD  [facility]  Hi! Looks like Soledad accidentally scheduled this patient in person on one of my virtual days - this Thursday. It looks like she probably needs an in person appointment with one of us RDs (me, Ingrid Jarrett), but if she wants to keep the Thursday appointment and is ok with doing a video we can try that. Otherwise you can schedule in person follow up with any of us. I've never seen her before.    May Rai on 9/16/2024 at 8:31 AM

## 2024-09-18 NOTE — TELEPHONE ENCOUNTER
Left Voicemail (2nd Attempt) for the patient to call back and schedule the following:    Appointment type: Diabetes ed   Provider: Luiza Brown or Ingrid   Return date: re-suresh appt on 9/19 to next avail in person (appt scheduled in person on day Stephanie is only seeing pt's virtually)  Specialty phone number: [phone]  Additional appointment(s) needed:   Additonal Notes: LVM x2, MyC x1 (converted to virtual visit due to 2nd attempt)  Stephanie Beach, RD  [facility]  Hi! Looks like Soledad accidentally scheduled this patient in person on one of my virtual days - this Thursday. It looks like she probably needs an in person appointment with one of us RDs (me, Ingrid Jarrett), but if she wants to keep the Thursday appointment and is ok with doing a video we can try that. Otherwise you can schedule in person follow up with any of us. I've never seen her before.    May Rai on 9/18/2024 at 8:27 AM

## 2024-09-20 ENCOUNTER — ANCILLARY PROCEDURE (OUTPATIENT)
Dept: MRI IMAGING | Facility: CLINIC | Age: 49
End: 2024-09-20
Attending: NEUROLOGICAL SURGERY
Payer: COMMERCIAL

## 2024-09-20 PROCEDURE — 70553 MRI BRAIN STEM W/O & W/DYE: CPT | Performed by: RADIOLOGY

## 2024-09-20 PROCEDURE — T1013 SIGN LANG/ORAL INTERPRETER: HCPCS | Mod: U3 | Performed by: RADIOLOGY

## 2024-09-20 PROCEDURE — A9585 GADOBUTROL INJECTION: HCPCS | Mod: JZ | Performed by: RADIOLOGY

## 2024-09-20 RX ORDER — GADOBUTROL 604.72 MG/ML
7.5 INJECTION INTRAVENOUS ONCE
Status: COMPLETED | OUTPATIENT
Start: 2024-09-20 | End: 2024-09-20

## 2024-09-20 RX ADMIN — GADOBUTROL 7.5 ML: 604.72 INJECTION INTRAVENOUS at 15:18

## 2024-09-22 NOTE — PROGRESS NOTES
CC -   NPDR    INTERVAL HISTORY - LV 3/2024, planned diagnostic MRx today    PMH -   Manuela Osman is a  49 year old year-old patient with history NPDR OU moderate with DMII     of HLD, diabetes,  referral from Dr. Govea for macular edema in the left eye.      PAST OCULAR SURGERY  None    RETINAL IMAGING:  OCT 09/22/2024  OD - small MAs with mild DME paratrafoveal, PHF attached   OS - large MA with mild DME, PHF attached,       ASSESSMENT & PLAN    # Moderate NPDR OU with DM II and DME   - BP/BG control      # DME OU   - noted 3/2023   - very mild   - mild worse today but still NVS   - recheck 6-12 months      # syneresis OU   - advised S/Sx RD 3/2024      # NS OU   - mild/early VS        # history of allergic conjunctivitis and dry eye   - continue ketotifen and AT prn (3/2024)   - ATs d/w patient 9/2024      Return in about 8 months (around 5/23/2025) for DFE OU, OCT OU.       ATTESTATION     Attending Physician Attestation:      Complete documentation of historical and exam elements from today's encounter can be found in the full encounter summary report (not reduplicated in this progress note).  I personally obtained the chief complaint(s) and history of present illness.  I confirmed and edited as necessary the review of systems, past medical/surgical history, family history, social history, and examination findings as documented by others; and I examined the patient myself.  I personally reviewed the relevant tests, images, and reports as documented above.  I formulated and edited as necessary the assessment and plan and discussed the findings and management plan with the patient and family    Maricarmen Harris MD, PhD  , Vitreoretinal Surgery  Department of Ophthalmology  [facility]

## 2024-09-23 ENCOUNTER — OFFICE VISIT (OUTPATIENT)
Dept: OPHTHALMOLOGY | Facility: CLINIC | Age: 49
End: 2024-09-23
Attending: OPHTHALMOLOGY
Payer: COMMERCIAL

## 2024-09-23 DIAGNOSIS — E11.3212 MILD NONPROLIFERATIVE DIABETIC RETINOPATHY OF LEFT EYE WITH MACULAR EDEMA ASSOCIATED WITH TYPE 2 DIABETES MELLITUS (H): ICD-10-CM

## 2024-09-23 PROCEDURE — 99214 OFFICE O/P EST MOD 30 MIN: CPT | Performed by: OPHTHALMOLOGY

## 2024-09-23 PROCEDURE — G0463 HOSPITAL OUTPT CLINIC VISIT: HCPCS | Performed by: OPHTHALMOLOGY

## 2024-09-23 PROCEDURE — 92134 CPTRZ OPH DX IMG PST SGM RTA: CPT | Performed by: OPHTHALMOLOGY

## 2024-09-23 ASSESSMENT — CONF VISUAL FIELD
OS_INFERIOR_TEMPORAL_RESTRICTION: 0
OS_INFERIOR_NASAL_RESTRICTION: 0
OS_NORMAL: 1
OD_SUPERIOR_TEMPORAL_RESTRICTION: 0
METHOD: COUNTING FINGERS
OS_SUPERIOR_NASAL_RESTRICTION: 0
OD_INFERIOR_NASAL_RESTRICTION: 0
OD_SUPERIOR_NASAL_RESTRICTION: 0
OS_SUPERIOR_TEMPORAL_RESTRICTION: 0
OD_INFERIOR_TEMPORAL_RESTRICTION: 0
OD_NORMAL: 1

## 2024-09-23 ASSESSMENT — VISUAL ACUITY
OS_SC: 20/25
OD_SC+: +1
OD_PH_SC+: +3
OD_PH_SC: 20/25
OS_SC+: -2
OD_SC: 20/30
METHOD: SNELLEN - LINEAR

## 2024-09-23 ASSESSMENT — REFRACTION_MANIFEST
OD_CYLINDER: +0.25
OS_ADD: +2.25
OS_CYLINDER: +0.25
OS_AXIS: 011
OD_AXIS: 136
OD_ADD: +2.25
OS_SPHERE: +0.25
OD_SPHERE: +0.50

## 2024-09-23 ASSESSMENT — SLIT LAMP EXAM - LIDS
COMMENTS: NORMAL
COMMENTS: NORMAL

## 2024-09-23 ASSESSMENT — CUP TO DISC RATIO
OS_RATIO: 0.35
OD_RATIO: 0.3

## 2024-09-23 ASSESSMENT — TONOMETRY
OD_IOP_MMHG: 11
IOP_METHOD: TONOPEN
OS_IOP_MMHG: 13

## 2024-09-23 ASSESSMENT — EXTERNAL EXAM - LEFT EYE: OS_EXAM: NORMAL

## 2024-09-23 ASSESSMENT — EXTERNAL EXAM - RIGHT EYE: OD_EXAM: NORMAL

## 2024-09-23 NOTE — NURSING NOTE
"Chief Complaints and History of Present Illnesses   Patient presents with    Diabetic Retinopathy Follow Up     Chief Complaint(s) and History of Present Illness(es)       Diabetic Retinopathy Follow Up              Laterality: both eyes              Comments     services present during exam today. Patient states some redness, blurry vision right eye, with long term continuous headache right side. Was told there is a \"tumor or spot present in head causing the headache.\" Some soreness top of right eye. No pain left eye. Patient states does see floaters and some flashes of light. Does not take any eyedrops. Patient will wear correction for both distance and near.    Type 2 DM, BS today at 183, last A1C was 8.0 March 2024.    Ramandeep Rodriguez on 9/23/2024 at 9:20 AM                       "

## 2024-10-02 ENCOUNTER — OFFICE VISIT (OUTPATIENT)
Dept: NEUROSURGERY | Facility: CLINIC | Age: 49
End: 2024-10-02
Payer: COMMERCIAL

## 2024-10-02 VITALS
SYSTOLIC BLOOD PRESSURE: 133 MMHG | HEART RATE: 79 BPM | DIASTOLIC BLOOD PRESSURE: 74 MMHG | RESPIRATION RATE: 16 BRPM | OXYGEN SATURATION: 97 %

## 2024-10-02 DIAGNOSIS — G93.9 BRAIN LESION: ICD-10-CM

## 2024-10-02 DIAGNOSIS — D35.2 PITUITARY ADENOMA (H): Primary | ICD-10-CM

## 2024-10-02 PROCEDURE — 99213 OFFICE O/P EST LOW 20 MIN: CPT | Performed by: PHYSICIAN ASSISTANT

## 2024-10-02 ASSESSMENT — PAIN SCALES - GENERAL: PAINLEVEL: NO PAIN (0)

## 2024-10-02 NOTE — PROGRESS NOTES
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 On 2020, patient presented to [facility] with fever to 103, sore throat, weakness, dizziness, and neck stiffness. She went to the ED because she was having COVID like symptoms and was dehydrated. AFB culture was positive for tuberculosis at that time. She was evaluated by Dr. Kwon initially 3/27/2024 for a pituitary stalk lesion. Since that time, she's had a workup by Infectious Disease which was negative. I met with her 2024 for close follow up at which time she had not had repeat imaging but felt well. She just had follow up MRI and is here for review. The visit was accomplished with the help of a Citizen.VC . She is here with her daughter and feels well. She denies new vision changes or other concerns today.    Review of Systems:   Pertinent items are noted in HPI or as in patient entered ROS below, remainder of complete ROS is negative.     Physical Exam:   /74 (BP Location: Right arm, Patient Position: Sitting)   Pulse 79   Resp 16   SpO2 97%   General: No acute distress.    Eyes: Conjunctivae are normal.  MSK: Moves all extremities.  No obvious deformity.  Neuro: The patient is fully oriented. Speech is normal. Facial nerve function is normal, rated as a House Brackmann 1. Gait is normal.   Psych: Normal mood and affect. Behavior is normal.      Imaging:  MRI done 2024 was reviewed today which reveals a stable enhancing T1 hyperintense round lesion abutting the pituitary stalk. Size has not changed appreciably compared to previous.      Assessment:  Pituitary stalk region mass,  follow up visit     Plan:  We reviewed the MRI done recently and compared this to previous, which shows a largely stable pituitary stalk lesion. We discussed ongoing observation with repeat MRI in 1 year, patient is agreeable. She was encouraged to reach out sooner with new concerns.       Alexandra Toribio PA-C  Department of Neurosurgery

## 2024-10-02 NOTE — PATIENT INSTRUCTIONS
Return in 1 year, with pituitary MRI prior  Please reach out sooner with new concerns - new blurry or double vision

## 2024-10-18 ENCOUNTER — ALLIED HEALTH/NURSE VISIT (OUTPATIENT)
Dept: EDUCATION SERVICES | Facility: CLINIC | Age: 49
End: 2024-10-18
Payer: COMMERCIAL

## 2024-10-18 DIAGNOSIS — Z79.4 TYPE 2 DIABETES MELLITUS WITHOUT COMPLICATION, WITH LONG-TERM CURRENT USE OF INSULIN (H): Primary | ICD-10-CM

## 2024-10-18 DIAGNOSIS — E11.9 TYPE 2 DIABETES MELLITUS WITHOUT COMPLICATION, WITH LONG-TERM CURRENT USE OF INSULIN (H): Primary | ICD-10-CM

## 2024-10-18 PROCEDURE — G0108 DIAB MANAGE TRN  PER INDIV: HCPCS | Performed by: DIETITIAN, REGISTERED

## 2024-10-18 NOTE — LETTER
10/18/2024      Manuela Osman  [address]  [facility] [address]      Dear Colleague,    Thank you for referring your patient, Manuela Osman, to the [facility] DIABETES EDUCATION [place]. Please see a copy of my visit note below.    DIABETES SELF MANAGEMENT EDUCATION: Previous Diagnosis/Individual Diabetes Review    Manuela Osman presents today for education related to Type 2 diabetes.    She is accompanied by daughter and Vietnamese   Referring Provider: Olinda REICH    DIABETES RELATED CONCERNS/COMMENTS: fluctuating blood sugars, weight gain    Patient's emotional response to diabetes: expresses readiness to learn and unable to assess    Past Diabetes Education: No  Support system: family  Living Situation: lives with spouse/significant other and adult children  Occupation: not working outside the home    ASSESSMENT/PLAN:  T2D seen for Comprehensive Knowledge Assessment and Instruction and follow-up. We reviewed her [employer] reports today. She takes insulin after she eats. Is having highs and lows. Reviewed how her medications work. Libreview reports reviewed - Time in Range (70-180mg/dL) is 40%, time above range is 60%. 0% lows. Discussed GLP medications, Patient denies any of the contraindications to a GLP1 medication including a history of Pancreatitis, history of Medullary Thyroid Cancer, or Multiple Endocrine Neoplasia Syndrome Type 2.  Manuela would benefit from one as she has trouble taking mealtime insulin and would like to lose weight. Will discuss with her provider.    Continue current insulin doses.  Start taking Novolog 10-15 minutes prior to eating  I'll ask your provider about Mounjaro and let you know.      Current Diabetes Management per Patient:  Diabetes medications: YES, Jardiance 10 mg daily, Metformin 500 mg daily, Novolog 8 units 2 times daily, Tresiba 40 units daily (family helps to remember)  At risk for hypoglycemia? Yes, not often,  lightheaded,sweaty,feels dehydrated   BG meter: Contour Next USB meter  Patient glucose self monitoring as follows: per Cami 3 sensor.             Nutrition:  Breakfast: (8-9a) oatmeal,eggs and whole grain slices of toast, water  Snack:  Lunch:(2p)chicken salad, veggie sampler with savory whole grain pancake, brown rice and chicken and salad, water  Snack:   Dinner: ()  Snack: dates for low blood sugar treatment    Physical Activity:    Walks a lot, 10K a day, outdoors morning and afternoon    Diabetes Complications:  Not discussed today.    Diabetes knowledge and skills assessment:     Patient is knowledgeable in diabetes management concepts related to: Monitoring  Barriers to Learning Assessment: No Barriers identified    Based on learning assessment above, most appropriate setting for further diabetes education would be: Individual setting.      See Patient Instructions for co-developed, patient-stated behavior change goals.  AVS printed and provided to patient today.        Time Spent: 60 minutes  Encounter Type: Individual    Any diabetes medication initiation or dose changes were made via the CDCES Standing Orders per the patient's referring provider. A copy of this encounter was shared with the provider.       Again, thank you for allowing me to participate in the care of your patient.        Sincerely,        Luiza Manzano, RD, LD

## 2024-10-18 NOTE — PROGRESS NOTES
DIABETES SELF MANAGEMENT EDUCATION: Previous Diagnosis/Individual Diabetes Review    Manuela Osman presents today for education related to Type 2 diabetes.    She is accompanied by daughter and Tongan   Referring Provider: Olinda REICH    DIABETES RELATED CONCERNS/COMMENTS: fluctuating blood sugars, weight gain    Patient's emotional response to diabetes: expresses readiness to learn and unable to assess    Past Diabetes Education: No  Support system: family  Living Situation: lives with spouse/significant other and adult children  Occupation: not working outside the home    ASSESSMENT/PLAN:  T2D seen for Comprehensive Knowledge Assessment and Instruction and follow-up. We reviewed her [employer] reports today. She takes insulin after she eats. Is having highs and lows. Reviewed how her medications work. Libreview reports reviewed - Time in Range (70-180mg/dL) is 40%, time above range is 60%. 0% lows. Discussed GLP medications, Patient denies any of the contraindications to a GLP1 medication including a history of Pancreatitis, history of Medullary Thyroid Cancer, or Multiple Endocrine Neoplasia Syndrome Type 2.  Manuela would benefit from one as she has trouble taking mealtime insulin and would like to lose weight. Will discuss with her provider.    Continue current insulin doses.  Start taking Novolog 10-15 minutes prior to eating  I'll ask your provider about Mounjaro and let you know.      Current Diabetes Management per Patient:  Diabetes medications: YES, Jardiance 10 mg daily, Metformin 500 mg daily, Novolog 8 units 2 times daily, Tresiba 40 units daily (family helps to remember)  At risk for hypoglycemia? Yes, not often, lightheaded,sweaty,feels dehydrated   BG meter: Contour Next USB meter  Patient glucose self monitoring as follows: per Cami 3 sensor.             Nutrition:  Breakfast: (8-9a) oatmeal,eggs and whole grain slices of toast, water  Snack:  Lunch:(2p)chicken salad, veggie  sampler with savory whole grain pancake, brown rice and chicken and salad, water  Snack:   Dinner: ()  Snack: dates for low blood sugar treatment    Physical Activity:    Walks a lot, 10K a day, outdoors morning and afternoon    Diabetes Complications:  Not discussed today.    Diabetes knowledge and skills assessment:     Patient is knowledgeable in diabetes management concepts related to: Monitoring  Barriers to Learning Assessment: No Barriers identified    Based on learning assessment above, most appropriate setting for further diabetes education would be: Individual setting.      See Patient Instructions for co-developed, patient-stated behavior change goals.  AVS printed and provided to patient today.        Time Spent: 60 minutes  Encounter Type: Individual    Any diabetes medication initiation or dose changes were made via the CDCES Standing Orders per the patient's referring provider. A copy of this encounter was shared with the provider.

## 2024-10-18 NOTE — PATIENT INSTRUCTIONS
Graham Roy,    It was nice meeting with you. Here is a summary of our visit and plan:    Continue current insulin doses.  Start taking Novolog 10-15 minutes prior to eating  I'll ask your provider about Mounjaro and let you know.      Blood Glucose Targets:   Fasting and before meal: 80 - 130 mg/dL   2 hours after the start of a meal: less than 180 mg/dL     Continuous Glucose Monitor Goals:     Glucose Target Range   (mg/dL)  Recommended Time in Target Range   70 to 180 70% or more of the time   Less than 70 Less than 4% of the time   Above 180 Less than 25% of time        Please call or MyChart if you have any questions.    Luiza COMER [facility]  Diabetes Educator  [address] [address]  Phone: [phone]  Email: [email or web address]

## 2024-10-22 ENCOUNTER — ANCILLARY PROCEDURE (OUTPATIENT)
Dept: ULTRASOUND IMAGING | Facility: CLINIC | Age: 49
End: 2024-10-22
Attending: FAMILY MEDICINE
Payer: COMMERCIAL

## 2024-10-22 DIAGNOSIS — M79.605 LEFT LEG PAIN: ICD-10-CM

## 2024-10-22 PROCEDURE — 93971 EXTREMITY STUDY: CPT | Mod: LT | Performed by: STUDENT IN AN ORGANIZED HEALTH CARE EDUCATION/TRAINING PROGRAM

## 2024-11-23 ENCOUNTER — HEALTH MAINTENANCE LETTER (OUTPATIENT)
Age: 49
End: 2024-11-23

## 2025-01-08 ENCOUNTER — ALLIED HEALTH/NURSE VISIT (OUTPATIENT)
Dept: EDUCATION SERVICES | Facility: CLINIC | Age: 50
End: 2025-01-08
Payer: COMMERCIAL

## 2025-01-08 DIAGNOSIS — Z79.4 TYPE 2 DIABETES MELLITUS WITHOUT COMPLICATION, WITH LONG-TERM CURRENT USE OF INSULIN (H): Primary | ICD-10-CM

## 2025-01-08 DIAGNOSIS — E11.9 TYPE 2 DIABETES MELLITUS WITHOUT COMPLICATION, WITH LONG-TERM CURRENT USE OF INSULIN (H): Primary | ICD-10-CM

## 2025-01-08 RX ORDER — INSULIN DEGLUDEC 100 U/ML
40 INJECTION, SOLUTION SUBCUTANEOUS EVERY MORNING
Qty: 15 ML | Refills: 11 | Status: SHIPPED | OUTPATIENT
Start: 2025-01-08

## 2025-01-08 NOTE — PROGRESS NOTES
DIABETES SELF MANAGEMENT EDUCATION: Previous Diagnosis/Individual Diabetes Review    Manuela Osman presents today for education related to Type 2 diabetes.    She is accompanied by daughter and [place] citizen   Referring Provider: Olinda REICH    DIABETES RELATED CONCERNS/COMMENTS: fluctuating blood sugars, weight gain    Patient's emotional response to diabetes: expresses readiness to learn and unable to assess    Past Diabetes Education: No  Support system: family  Living Situation: lives with spouse/significant other and adult children  Occupation: not working outside the home    ASSESSMENT/PLAN:  T2D seen for Comprehensive Knowledge Assessment and Instruction and follow-up. We reviewed her [employer] reports today. She takes insulin after she eats. Is having highs and lows. Reviewed how her medications work. Libreview reports reviewed - Time in Range (70-180mg/dL) is 38%, time above range is 60%. 0% lows. Now taking lantus but she prefers tresiba which she was previously taking. Will order Tresiba. Discussed GLP medications, Patient denies any of the contraindications to a GLP1 medication including a history of Pancreatitis, history of Medullary Thyroid Cancer, or Multiple Endocrine Neoplasia Syndrome Type 2.  Manuela would benefit from one as she has trouble taking mealtime insulin and would like to lose weight. She will discss with her PCP    Switch from Lantus 40 units daily to Tresiba 40 units daily.    Start taking Novolog 10-15 minutes prior to eating    Ask your PCP about Mounjaro. It is covered for you at $0 copay and will help with weight loss and your blood sugars.     Ask your PCP about a nephrology / kidney doctor referral.        Current Diabetes Management per Patient:  Diabetes medications: YES, Jardiance 10 mg daily, Metformin 500 mg daily, Novolog 8 units 2 times daily, Tresiba 40 units daily (family helps to remember)  At risk for hypoglycemia? Yes, not often, lightheaded,sweaty,feels  dehydrated   BG meter: Contour Next USB meter  Patient glucose self monitoring as follows: per Cami 3 sensor.                 Nutrition:  Breakfast: (8-9a) oatmeal,eggs and whole grain slices of toast, water  Snack:  Lunch:(2p)chicken salad, veggie sampler with savory whole grain pancake, brown rice and chicken and salad, water  Snack:   Dinner: ()  Snack: dates for low blood sugar treatment    Physical Activity:    Walks a lot, 10K a day, outdoors morning and afternoon        Time Spent: 30 minutes  Encounter Type: Individual    Any diabetes medication initiation or dose changes were made via the CDCES Standing Orders per the patient's referring provider. A copy of this encounter was shared with the provider.

## 2025-01-08 NOTE — PATIENT INSTRUCTIONS
Graham Roy,    It was nice meeting with you. Here is a summary of our visit and plan:    Switch from Lantus 40 units daily to Tresiba 40 units daily.    Start taking Novolog 10-15 minutes prior to eating    Ask your PCP about Mounjaro. It is covered for you at $0 copay and will help with weight loss and your blood sugars.     Ask your PCP about a nephrology / kidney doctor referral.        Blood Glucose Targets:   Fasting and before meal: 80 - 130 mg/dL   2 hours after the start of a meal: less than 180 mg/dL     Continuous Glucose Monitor Goals:     Glucose Target Range   (mg/dL)  Recommended Time in Target Range   70 to 180 70% or more of the time   Less than 70 Less than 4% of the time   Above 180 Less than 25% of time        Please call or MyChart if you have any questions.    .    Luiza MERA  Diabetes Educator  [facility] [address] [address]  Phone: [phone]  Email: [email or web address]

## 2025-01-13 ENCOUNTER — TELEPHONE (OUTPATIENT)
Dept: ENDOCRINOLOGY | Facility: CLINIC | Age: 50
End: 2025-01-13
Payer: COMMERCIAL

## 2025-01-13 NOTE — TELEPHONE ENCOUNTER
Left Voicemail (1st Attempt) and Sent Mychart (1st Attempt) for the patient to call back and schedule the following:    Appointment type: NEW DIABETES  Provider: Leela Vital PA-C or any who sees diabetes  Return date: next available, BRIANA [place]  Specialty phone number: [phone]  Additional appointment(s) needed: N/A  Additonal Notes: LVMx1 with , MyCx1    Torri Salazar RN  P [id number]  CCs: Please help schedule first available BRIANA within 2 months with any provider who manages type 2 DM.    Available appts:  Ahmed 03/21 in person [facility]  Cathy 03/25 virtual [facility]  Moheet 03/25 virtual [facility]  Ahmed 03/31 in person [facility]  Amanuel 05/22 in person MG    Please note that the above appointment(s) will require manual scheduling as they are marked as BRIANA and will not appear using auto search. Do not schedule the patient if another patient has already been scheduled in the requested appointment slot.     Anna Howard on 1/13/2025 at 4:09 PM

## 2025-01-15 NOTE — TELEPHONE ENCOUNTER
Left Voicemail (2nd Attempt) and Sent Letter for the patient to call back and schedule the following:     Appointment type: NEW DIABETES  Provider: Leela Vital PA-C or any who sees diabetes  Return date: next available, BRIANA [place]  Specialty phone number: [phone]  Additional appointment(s) needed: N/A  Additonal Notes: LVMx2 with , MyCx1, Letter Sent     Torri Salazar RN  P [id number]  CCs: Please help schedule first available BRIANA within 2 months with any provider who manages type 2 DM.     Available appts:  Ahmed 03/21 in person [facility]  Cathy 03/25 virtual [facility]  Beverley 03/25 virtual [facility]  Renomed 03/31 in person [facility]  Amanuel 05/22 in person MG     Please note that the above appointment(s) will require manual scheduling as they are marked as BRIANA and will not appear using auto search. Do not schedule the patient if another patient has already been scheduled in the requested appointment slot.      Anna Howard on 1/15/2025 at 9:32 AM

## 2025-02-09 ENCOUNTER — HEALTH MAINTENANCE LETTER (OUTPATIENT)
Age: 50
End: 2025-02-09

## 2025-02-17 ENCOUNTER — TELEPHONE (OUTPATIENT)
Dept: ENDOCRINOLOGY | Facility: CLINIC | Age: 50
End: 2025-02-17
Payer: COMMERCIAL

## 2025-02-17 NOTE — TELEPHONE ENCOUNTER
Patient confirmed scheduled appointment:  Date: 4/18   Time: 2:30 pm  Visit type: New Diabetes   Provider: Eleanor   Location: [facility] in person  Testing/imaging:   Additional notes: Spoke to pt and re-suresh appt on 3/31 to next avail BRIANA due to changes in the providers suresh. Appt on 3/31 was triaged for BRIANA that is why it was re-scheduled in a BRIANA slot.     May Rai on 2/17/2025 at 10:39 AM

## 2025-02-19 NOTE — CONFIDENTIAL NOTE
RECORDS RECEIVED FROM: internal    DATE RECEIVED: 4/18/25    NOTES (FOR ALL VISITS) STATUS DETAILS   OFFICE NOTES from referring provider internal  Olinda Nur, APRN CNP    MEDICATION LIST internal     IMAGING      MRI (BRAIN) internal / ce  internal -9.20.24    [facility]- 4.20.24,  3.4.24,    XR (Chest) Ce  [facility]- 12.16.24    CT (HEAD/NECK/CHEST/ABDOMEN) ce [facility]- 6.18.24, 3.19.24   ULTRASOUND (HEAD/NECK) ce [facility]- 1.4.24    LABS     DIABETES: HBGA1C, CREATININE, FASTING LIPIDS, MICROALBUMIN URINE, POTASSIUM, TSH, T4    THYROID: TSH, T4, CBC, THYRODLONULIN, TOTAL T3, FREE T4, CALCITONIN, CEA internal / ce  [facility]- 12.16.24   HBGA1C- 10.21.24  MICROALBUMIN/Creatinine-10.16.24   Glucose meter- 5.29.24   FSH- 3.27.24   Lipid- 11.27.23

## 2025-02-25 ENCOUNTER — TRANSCRIBE ORDERS (OUTPATIENT)
Dept: OTHER | Age: 50
End: 2025-02-25

## 2025-02-25 DIAGNOSIS — R80.9 MICROALBUMINURIA: Primary | ICD-10-CM

## 2025-02-26 ENCOUNTER — ALLIED HEALTH/NURSE VISIT (OUTPATIENT)
Dept: EDUCATION SERVICES | Facility: CLINIC | Age: 50
End: 2025-02-26
Payer: COMMERCIAL

## 2025-02-26 DIAGNOSIS — E11.9 TYPE 2 DIABETES MELLITUS WITHOUT COMPLICATION, WITH LONG-TERM CURRENT USE OF INSULIN (H): Primary | ICD-10-CM

## 2025-02-26 DIAGNOSIS — Z79.4 TYPE 2 DIABETES MELLITUS WITHOUT COMPLICATION, WITH LONG-TERM CURRENT USE OF INSULIN (H): Primary | ICD-10-CM

## 2025-02-26 NOTE — PATIENT INSTRUCTIONS
Graham Roy,    It was nice meeting with you. Here is a summary of our visit and plan:    Continue Lantus 40 units and Novolog 8 units with meals.    Continue Mounjaro 2.5 mg weekly. Can consider increasing to 5 mg weekly if tolerating after 4 weeks.    Follow-up with Luiza on 3/24/205        Blood Glucose Targets:   Fasting and before meal: 80 - 130 mg/dL   2 hours after the start of a meal: less than 180 mg/dL     Continuous Glucose Monitor Goals:     Glucose Target Range   (mg/dL)  Recommended Time in Target Range   70 to 180 70% or more of the time   Less than 70 Less than 4% of the time   Above 180 Less than 25% of time        Please call or MyChart if you have any questions.        Luiza COMER [facility]  Diabetes Educator  [facility] [address] [address]  Phone: [phone]  Email: [email or web address]

## 2025-02-26 NOTE — PROGRESS NOTES
DIABETES SELF MANAGEMENT EDUCATION: Previous Diagnosis/Individual Diabetes Review    Manuela Osman presents today for education related to Type 2 diabetes.    She is accompanied by daughter and Citizen of [place]  [id number]  Referring Provider: Olinda REICH    DIABETES RELATED CONCERNS/COMMENTS: fluctuating blood sugars, weight gain    Patient's emotional response to diabetes: expresses readiness to learn and unable to assess    Past Diabetes Education: No  Support system: family  Living Situation: lives with spouse/significant other and adult children  Occupation: not working outside the home    ASSESSMENT/PLAN:  T2D seen for Comprehensive Knowledge Assessment and Instruction and follow-up. We reviewed her [email or web address] reports today. She takes insulin after she eats. Is having highs and lows. Reviewed how her medications work. Libreview reports reviewed - Time in Range (70-180mg/dL) is 42%, time above range is 58%. 0% lows. Now taking lantus but she prefers tresiba which she was previously taking. Tresiba requires a prior auth from her insurance. Started on Mounjaro 2.5 mg weekly by her PCP last Thursday. Tolerating it so far. Glucoses are already improving.Encouraged her to reach out if having lows so we can help adjust insulin doses. Current doses appear adequate.     Continue Lantus 40 units and Novolog 8 units with meals.    Continue Mounjaro 2.5 mg weekly. Can consider increasing to 5 mg weekly if tolerating after 4 weeks.    Follow-up with Luiza on 3/24/205        Current Diabetes Management per Patient:  Diabetes medications: YES, Jardiance 10 mg daily, Metformin 500 mg daily, Novolog 8 units 2 times daily, Tresiba 40 units daily (family helps to remember)  At risk for hypoglycemia? Yes, not often, lightheaded,sweaty,feels dehydrated   BG meter: Contour Next USB meter  Patient glucose self monitoring as follows: per Cami 3 sensor.                 Nutrition:  Breakfast: (8-9a) oatmeal,eggs and whole grain  slices of toast, water  Snack:  Lunch:(2p)chicken salad, veggie sampler with savory whole grain pancake, brown rice and chicken and salad, water  Snack:   Dinner: ()  Snack: dates for low blood sugar treatment    Physical Activity:    Walks a lot, 10K a day, outdoors morning and afternoon        Time Spent: 30 minutes  Encounter Type: Individual    Any diabetes medication initiation or dose changes were made via the [facility] per the patient's referring provider. A copy of this encounter was shared with the provider.

## 2025-03-09 ENCOUNTER — HEALTH MAINTENANCE LETTER (OUTPATIENT)
Age: 50
End: 2025-03-09

## 2025-03-24 ENCOUNTER — ALLIED HEALTH/NURSE VISIT (OUTPATIENT)
Dept: EDUCATION SERVICES | Facility: CLINIC | Age: 50
End: 2025-03-24
Payer: COMMERCIAL

## 2025-03-24 DIAGNOSIS — Z79.4 TYPE 2 DIABETES MELLITUS WITHOUT COMPLICATION, WITH LONG-TERM CURRENT USE OF INSULIN (H): Primary | ICD-10-CM

## 2025-03-24 DIAGNOSIS — E11.9 TYPE 2 DIABETES MELLITUS WITHOUT COMPLICATION, WITH LONG-TERM CURRENT USE OF INSULIN (H): Primary | ICD-10-CM

## 2025-03-24 PROCEDURE — G0108 DIAB MANAGE TRN  PER INDIV: HCPCS | Performed by: DIETITIAN, REGISTERED

## 2025-03-24 RX ORDER — HYDROCHLOROTHIAZIDE 12.5 MG/1
CAPSULE ORAL
Qty: 2 EACH | Refills: 11 | Status: SHIPPED | OUTPATIENT
Start: 2025-03-24

## 2025-03-24 NOTE — PATIENT INSTRUCTIONS
Graham Roy,    It was nice meeting with you. Here is a summary of our visit and plan:    Lower Lantus to 32 units    Lower Novolog to 4-6 units with meals.    Can increase Mounjaro to 5 mg weekly when you see Dr. Webster on April 18th      Follow-up with Luiza on May 21st    Helpful Tips:  Eat smaller portions of food than you are used to. You will get full faster due to how the medicine works.  Eat more slowly than usual and stop eating at the first sign of fullness.  Avoid eating meals late at night and then lying down. This can increase the likelihood of acid reflux (indigestion or heartburn).  Order a take-home container in restaurants when you place your order. Put away half your portion so you eat less. Or keep portions small by choosing from healthy salads, appetizers, and/or sides rather than ordering a main course. Or split and share a main course.  Limit high-fat and greasy foods. They can take longer to digest and cause more GI symptoms.  Limit spicy foods if you regularly have heartburn/indigestion.  Eat enough high-fiber foods - fruits, vegetables, and whole grains - to minimize constipation.  Drink plenty of fluids, especially water, Also try drinking mint or ginger tea to settle your stomach if you experience nausea.      Contact information:   If you have concerns, please send a [employer] message or call the clinic at [phone].    For more urgent concerns that do not require 330, please call [phone] after hours/weekends and ask to speak with the Endocrinologist on call.    To schedule a Diabetes Education appointment, call [phone]    Please let us know if you are having low blood sugars less than 70 or over 300 mg/dL.  Do not wait until your next appointment if this is happening.

## 2025-03-24 NOTE — PROGRESS NOTES
DIABETES SELF MANAGEMENT EDUCATION: Previous Diagnosis/Individual Diabetes Review    Manuela Osman presents today for education related to Type 2 diabetes.    She is accompanied by daughter and Micronesian  [id number]  Referring Provider: Olinda REICH    DIABETES RELATED CONCERNS/COMMENTS: fluctuating blood sugars, weight gain    Patient's emotional response to diabetes: expresses readiness to learn and unable to assess    Past Diabetes Education: No  Support system: family  Living Situation: lives with spouse/significant other and adult children  Occupation: not working outside the home    ASSESSMENT/PLAN:  T2D seen for Comprehensive Knowledge Assessment and Instruction and follow-up. We reviewed her [employer] reports today. She takes insulin after she eats. Is having highs and lows. Reviewed how her medications work. [employer] reports reviewed - Time in Range (70-180mg/dL) is 84%, time above range is 15%. 1% lows. Now taking lantus but she prefers tresiba which she was previously taking. Tresiba requires a prior auth from her insurance. Has 3 more pens of Mounjaro 2.5 mg weekly. Will benefit from increasing Mounjaro dose and lowering insulin doses. Encouraged her to reach out if having lows so we can help adjust insulin doses.     Lower Lantus to 32 units    Lower Novolog to 4-6 units with meals.    Can increase Mounjaro to 5 mg weekly when you see Dr. Webster on April 18th          Current Diabetes Management per Patient:  Diabetes medications: YES, Jardiance 10 mg daily, Metformin 500 mg daily, Novolog 8 units 2 times daily, Tresiba 40 units daily (family helps to remember)  At risk for hypoglycemia? Yes, not often, lightheaded,sweaty,feels dehydrated   BG meter: Contour Next USB meter  Patient glucose self monitoring as follows: per Cami 3 sensor.                   Nutrition:  Breakfast: (8-9a) oatmeal,eggs and whole grain slices of toast, water  Snack:  Lunch:(2p)chicken salad, veggie sampler with  savory whole grain pancake, brown rice and chicken and salad, water  Snack:   Dinner: ()  Snack: dates for low blood sugar treatment    Physical Activity:    Walks a lot, 10K a day, outdoors morning and afternoon        Time Spent: 30 minutes  Encounter Type: Individual    Any diabetes medication initiation or dose changes were made via the CDCES Standing Orders per the patient's referring provider. A copy of this encounter was shared with the provider.

## 2025-04-18 ENCOUNTER — PRE VISIT (OUTPATIENT)
Dept: ENDOCRINOLOGY | Facility: CLINIC | Age: 50
End: 2025-04-18

## 2025-05-15 ENCOUNTER — ANCILLARY PROCEDURE (OUTPATIENT)
Dept: MRI IMAGING | Facility: CLINIC | Age: 50
End: 2025-05-15
Attending: FAMILY MEDICINE
Payer: COMMERCIAL

## 2025-05-15 DIAGNOSIS — M54.50 LOW BACK PAIN WITH RADIATION: ICD-10-CM

## 2025-05-15 PROCEDURE — 72148 MRI LUMBAR SPINE W/O DYE: CPT | Performed by: RADIOLOGY

## 2025-05-15 PROCEDURE — T1013 SIGN LANG/ORAL INTERPRETER: HCPCS

## 2025-05-21 ENCOUNTER — ALLIED HEALTH/NURSE VISIT (OUTPATIENT)
Dept: EDUCATION SERVICES | Facility: CLINIC | Age: 50
End: 2025-05-21
Payer: COMMERCIAL

## 2025-05-21 DIAGNOSIS — E11.9 TYPE 2 DIABETES MELLITUS WITHOUT COMPLICATION, WITH LONG-TERM CURRENT USE OF INSULIN (H): Primary | ICD-10-CM

## 2025-05-21 DIAGNOSIS — Z79.4 TYPE 2 DIABETES MELLITUS WITHOUT COMPLICATION, WITH LONG-TERM CURRENT USE OF INSULIN (H): Primary | ICD-10-CM

## 2025-05-21 PROCEDURE — 99207 PR NO CHARGE LOS: CPT | Performed by: DIETITIAN, REGISTERED

## 2025-05-21 NOTE — PATIENT INSTRUCTIONS
Graham Roy,    It was nice meeting with you. Here is a summary of our visit and plan:    Increase Mounjaro to 7.5 mg weekly.  Stop using NovoLog with meals  Reduce the dose of Tresiba down to 28 units daily  Can lower by 2 units if fasting glucoses < 100 mg/dL  Increase metformin dose to 500 mg twice daily.      Helpful Tips:  Eat smaller portions of food than you are used to. You will get full faster due to how the medicine works.  Eat more slowly than usual and stop eating at the first sign of fullness.  Avoid eating meals late at night and then lying down. This can increase the likelihood of acid reflux (indigestion or heartburn).  Order a take-home container in restaurants when you place your order. Put away half your portion so you eat less. Or keep portions small by choosing from healthy salads, appetizers, and/or sides rather than ordering a main course. Or split and share a main course.  Limit high-fat and greasy foods. They can take longer to digest and cause more GI symptoms.  Limit spicy foods if you regularly have heartburn/indigestion.  Eat enough high-fiber foods - fruits, vegetables, and whole grains - to minimize constipation.  Drink plenty of fluids, especially water, Also try drinking mint or ginger tea to settle your stomach if you experience nausea.        Contact information:   If you have concerns, please send a [facility] message or call the clinic at [phone].    For more urgent concerns that do not require 182, please call [phone] after hours/weekends and ask to speak with the Endocrinologist on call.    To schedule a Diabetes Education appointment, call [phone]    Please let us know if you are having low blood sugars less than 70 or over 250 mg/dL.  Do not wait until your next appointment if this is happening.

## 2025-05-21 NOTE — PROGRESS NOTES
DIABETES SELF MANAGEMENT EDUCATION: Previous Diagnosis/Individual Diabetes Review    Manuela Osman presents today for education related to Type 2 diabetes.    She is accompanied by daughter and Bahamian  [id number]  Referring Provider: RAMIRO Cates    DIABETES RELATED CONCERNS/COMMENTS: out of Mounjaro for two weeks    Patient's emotional response to diabetes: expresses readiness to learn and unable to assess    Past Diabetes Education: No  Support system: family  Living Situation: lives with spouse/significant other and adult children  Occupation: not working outside the home        ASSESSMENT/PLAN:  T2D seen for Comprehensive Knowledge Assessment and Instruction and follow-up. We reviewed her [employer] reports today. Out of Mounjaro. Lowered Tresiba dose and stopped Novolog Lib[employer] reports reviewed - Time in Range (70-180mg/dL) is 80%, time above range is 19%. 1% lows. We called her pharmacy and her Mounjaro 7.5 mg dose is ready for pickup.    Increase Mounjaro to 7.5 mg weekly.  Stop using NovoLog with meals  Reduce the dose of Tresiba down to 28 units daily  Can lower by 2 units if fasting glucoses < 100 mg/dL  Increase metformin dose to 500 mg twice daily.      Current Diabetes Management per Patient:  Diabetes medications: YES, Jardiance 10 mg daily, Metformin 500 mg daily, Novolog stopped, Tresiba 28 units daily (family helps to remember)  At risk for hypoglycemia? Yes, not often, lightheaded,sweaty,feels dehydrated   BG meter: Contour Next USB meter  Patient glucose self monitoring as follows: per Cami 3 sensor.                   Nutrition:  Breakfast: (8-9a) oatmeal,eggs and whole grain slices of toast, water  Snack:  Lunch:(2p)chicken salad, veggie sampler with savory whole grain pancake, brown rice and chicken and salad, water  Snack:   Dinner: ()  Snack: dates for low blood sugar treatment    Physical Activity:    Walks a lot, 10K a day, outdoors morning and afternoon        Time Spent: 28  minutes  Encounter Type: Individual    Any diabetes medication initiation or dose changes were made via the [facility] Standing Orders per the patient's referring provider. A copy of this encounter was shared with the provider.

## 2025-05-29 DIAGNOSIS — E11.3212 MILD NONPROLIFERATIVE DIABETIC RETINOPATHY OF LEFT EYE WITH MACULAR EDEMA ASSOCIATED WITH TYPE 2 DIABETES MELLITUS (H): Primary | ICD-10-CM

## 2025-06-10 ENCOUNTER — OFFICE VISIT (OUTPATIENT)
Dept: OPHTHALMOLOGY | Facility: CLINIC | Age: 50
End: 2025-06-10
Attending: OPHTHALMOLOGY
Payer: COMMERCIAL

## 2025-06-10 DIAGNOSIS — E11.3212 MILD NONPROLIFERATIVE DIABETIC RETINOPATHY OF LEFT EYE WITH MACULAR EDEMA ASSOCIATED WITH TYPE 2 DIABETES MELLITUS (H): ICD-10-CM

## 2025-06-10 DIAGNOSIS — H25.13 AGE-RELATED NUCLEAR CATARACT OF BOTH EYES: ICD-10-CM

## 2025-06-10 DIAGNOSIS — H04.123 DRY EYE SYNDROME OF BOTH EYES: ICD-10-CM

## 2025-06-10 DIAGNOSIS — H43.393 VITREOUS SYNERESIS OF BOTH EYES: Primary | ICD-10-CM

## 2025-06-10 PROCEDURE — 92134 CPTRZ OPH DX IMG PST SGM RTA: CPT | Performed by: OPHTHALMOLOGY

## 2025-06-10 PROCEDURE — G0463 HOSPITAL OUTPT CLINIC VISIT: HCPCS | Performed by: OPHTHALMOLOGY

## 2025-06-10 RX ORDER — CARBOXYMETHYLCELLULOSE SODIUM 5 MG/ML
1 SOLUTION/ DROPS OPHTHALMIC 4 TIMES DAILY
Qty: 30 ML | Refills: 11 | Status: SHIPPED | OUTPATIENT
Start: 2025-06-10

## 2025-06-10 ASSESSMENT — CUP TO DISC RATIO
OS_RATIO: 0.35
OD_RATIO: 0.3

## 2025-06-10 ASSESSMENT — VISUAL ACUITY
METHOD: SNELLEN - LINEAR
OD_SC: 20/25
OS_SC: 20/25

## 2025-06-10 ASSESSMENT — EXTERNAL EXAM - RIGHT EYE: OD_EXAM: NORMAL

## 2025-06-10 ASSESSMENT — EXTERNAL EXAM - LEFT EYE: OS_EXAM: NORMAL

## 2025-06-10 ASSESSMENT — TONOMETRY
IOP_METHOD: TONOPEN
OS_IOP_MMHG: 15
OD_IOP_MMHG: 14

## 2025-06-10 ASSESSMENT — SLIT LAMP EXAM - LIDS
COMMENTS: NORMAL
COMMENTS: NORMAL

## 2025-06-10 NOTE — PROGRESS NOTES
CC -   NPDR    INTERVAL HISTORY - LV 9/2024. No vision changes. Seeing 1 new small floater in the right eye. Denies flashes of light.     T2DM - 8.0%, on Mounjaro, Jardiance, metformin  HTN - losartan  HLD - lipitor    PMH -   Manuela Osman is a  50 year old year-old patient with history NPDR OU moderate with DMII     of HLD, diabetes,  referral from Dr. Govea for macular edema in the left eye.      PAST OCULAR SURGERY  None    RETINAL IMAGING:  OCT 06/10/2025  OD - no IRF or SRF, PHF attached   OS - large MA with mild DME, PHF attached,       ASSESSMENT & PLAN    # Mild NPDR OU with DM II   - BP/BG control   - Follow up 12 months      # DME OU   - noted 3/2023   - resolved on OCT 6/10/25 both eyes   - recheck 12 months      # syneresis OU   - advised S/Sx RD 3/2024      # NS OU   - mild/early VS      # history of allergic conjunctivitis and dry eye   - continue ketotifen and AT prn (3/2024)   - ATs d/w patient 9/2024      Return in about 1 year (around 6/10/2026) for DFE OU, OCT OU, [employer].     Angel Fu MD  PGY-3 Ophthalmology Resident  [facility]      ATTESTATION     Attending Physician Attestation:      Complete documentation of historical and exam elements from today's encounter can be found in the full encounter summary report (not reduplicated in this progress note).  I personally obtained the chief complaint(s) and history of present illness.  I confirmed and edited as necessary the review of systems, past medical/surgical history, family history, social history, and examination findings as documented by others; and I examined the patient myself.  I personally reviewed the relevant tests, images, and reports as documented above.  I personally reviewed the ophthalmic test(s) associated with this encounter, agree with the interpretation(s) as documented by the resident/fellow, and have edited the corresponding report(s) as necessary.   I formulated and edited as necessary the  assessment and plan and discussed the findings and management plan with the patient and family    Maricarmen Harris MD, PhD  , Vitreoretinal Surgery  Department of Ophthalmology  [facility]

## 2025-07-02 ENCOUNTER — ALLIED HEALTH/NURSE VISIT (OUTPATIENT)
Dept: EDUCATION SERVICES | Facility: CLINIC | Age: 50
End: 2025-07-02
Payer: COMMERCIAL

## 2025-07-02 DIAGNOSIS — Z79.4 TYPE 2 DIABETES MELLITUS WITH STAGE 3A CHRONIC KIDNEY DISEASE, WITH LONG-TERM CURRENT USE OF INSULIN (H): ICD-10-CM

## 2025-07-02 DIAGNOSIS — E11.22 TYPE 2 DIABETES MELLITUS WITH STAGE 3A CHRONIC KIDNEY DISEASE, WITH LONG-TERM CURRENT USE OF INSULIN (H): ICD-10-CM

## 2025-07-02 DIAGNOSIS — N18.31 TYPE 2 DIABETES MELLITUS WITH STAGE 3A CHRONIC KIDNEY DISEASE, WITH LONG-TERM CURRENT USE OF INSULIN (H): ICD-10-CM

## 2025-07-02 PROCEDURE — 99207 PR NO CHARGE LOS: CPT | Performed by: DIETITIAN, REGISTERED

## 2025-07-02 RX ORDER — INSULIN DEGLUDEC 100 U/ML
20 INJECTION, SOLUTION SUBCUTANEOUS EVERY MORNING
Qty: 15 ML | Refills: 11 | Status: SHIPPED | OUTPATIENT
Start: 2025-07-02 | End: 2025-07-02

## 2025-07-02 NOTE — PATIENT INSTRUCTIONS
Graham Roy,    It was nice meeting with you. Here is a summary of our visit and plan:    Decrease Mounjaro to 5 mg weekly.  Stop using NovoLog with meals  Reduce the dose of Lantus down to 20 units daily    Follow-up:  With Dr. Webster on 8/1  With Luiza on 9/2    Contact information:   If you have concerns, please send a [employer] message or call the clinic at [phone].    For more urgent concerns that do not require 457, please call [phone] after hours/weekends and ask to speak with the Endocrinologist on call.    To schedule a Diabetes Education appointment, call [phone]    Please let us know if you are having low blood sugars less than 70 or over 250 mg/dL.  Do not wait until your next appointment if this is happening.

## 2025-07-02 NOTE — PROGRESS NOTES
DIABETES SELF MANAGEMENT EDUCATION: Previous Diagnosis/Individual Diabetes Review    Manuela Osman presents today for education related to Type 2 diabetes.    She is accompanied by daughter and [employer]  through iPad  Referring Provider: RAMIRO Cates    DIABETES RELATED CONCERNS/COMMENTS: stopped Mounjaro due to nausea, vomiting and low blood sugars                ASSESSMENT/PLAN:  T2D seen for Comprehensive Knowledge Assessment and Instruction and follow-up. We reviewed her [employer] reports today. Stopped Mounjaro due to lows and needing to visit ED. Time in Range (70-180mg/dL) is 18%, time above range is 82%. Average glucose 228 mg/dL, GMI 8.8. Glucoses have been higher. Discussed restarting Mounjaro at lower dose and lowering dose of basal insulin. Also discussed emergency glucagon in case of severe hypogylcemia.     Decrease Mounjaro to 5 mg weekly.  Stop using NovoLog with meals  Reduce the dose of Lantus down to 20 units daily      Current Diabetes Management per Patient:  Diabetes medications: YES, Jardiance 10 mg daily, Metformin 500 mg daily, Novolog stopped, Tresiba 28 units daily (family helps to remember)  At risk for hypoglycemia? Yes, not often, lightheaded,sweaty,feels dehydrated   BG meter: Contour Next USB meter  Patient glucose self monitoring as follows: per Cami 3 sensor.                   Nutrition:  Breakfast: (8-9a) oatmeal,eggs and whole grain slices of toast, water  Snack:  Lunch:(2p)chicken salad, veggie sampler with savory whole grain pancake, brown rice and chicken and salad, water  Snack:   Dinner: ()  Snack: dates for low blood sugar treatment    Physical Activity:    Walks a lot, 10K a day, outdoors morning and afternoon        Time Spent: 28 minutes  Encounter Type: Individual    Any diabetes medication initiation or dose changes were made via the CDCES Standing Orders per the patient's referring provider. A copy of this encounter was shared with the provider.

## 2025-08-03 ENCOUNTER — HEALTH MAINTENANCE LETTER (OUTPATIENT)
Age: 50
End: 2025-08-03

## 2025-08-20 ENCOUNTER — TELEPHONE (OUTPATIENT)
Dept: NEPHROLOGY | Facility: CLINIC | Age: 50
End: 2025-08-20
Payer: COMMERCIAL

## 2025-08-25 DIAGNOSIS — N18.30 STAGE 3 CHRONIC KIDNEY DISEASE, UNSPECIFIED WHETHER STAGE 3A OR 3B CKD (H): ICD-10-CM

## 2025-08-25 DIAGNOSIS — N17.9 ACUTE KIDNEY INJURY: Primary | ICD-10-CM

## 2025-08-28 ENCOUNTER — OFFICE VISIT (OUTPATIENT)
Dept: NEPHROLOGY | Facility: CLINIC | Age: 50
End: 2025-08-28
Attending: FAMILY MEDICINE
Payer: COMMERCIAL

## 2025-08-28 ENCOUNTER — LAB (OUTPATIENT)
Dept: LAB | Facility: CLINIC | Age: 50
End: 2025-08-28
Payer: COMMERCIAL

## 2025-08-28 VITALS
SYSTOLIC BLOOD PRESSURE: 128 MMHG | WEIGHT: 152.8 LBS | OXYGEN SATURATION: 99 % | HEART RATE: 66 BPM | DIASTOLIC BLOOD PRESSURE: 76 MMHG | TEMPERATURE: 98.1 F | BODY MASS INDEX: 25.46 KG/M2 | HEIGHT: 65 IN

## 2025-08-28 DIAGNOSIS — N18.31 TYPE 2 DIABETES MELLITUS WITH STAGE 3A CHRONIC KIDNEY DISEASE, WITH LONG-TERM CURRENT USE OF INSULIN (H): ICD-10-CM

## 2025-08-28 DIAGNOSIS — Z79.4 TYPE 2 DIABETES MELLITUS WITH STAGE 3A CHRONIC KIDNEY DISEASE, WITH LONG-TERM CURRENT USE OF INSULIN (H): ICD-10-CM

## 2025-08-28 DIAGNOSIS — N18.30 STAGE 3 CHRONIC KIDNEY DISEASE, UNSPECIFIED WHETHER STAGE 3A OR 3B CKD (H): ICD-10-CM

## 2025-08-28 DIAGNOSIS — E11.22 TYPE 2 DIABETES MELLITUS WITH STAGE 3A CHRONIC KIDNEY DISEASE, WITH LONG-TERM CURRENT USE OF INSULIN (H): ICD-10-CM

## 2025-08-28 DIAGNOSIS — N18.2 CKD (CHRONIC KIDNEY DISEASE), STAGE II: Primary | ICD-10-CM

## 2025-08-28 DIAGNOSIS — N17.9 ACUTE KIDNEY INJURY: ICD-10-CM

## 2025-08-28 DIAGNOSIS — R80.9 MICROALBUMINURIA: ICD-10-CM

## 2025-08-28 LAB
ALBUMIN MFR UR ELPH: 6.9 MG/DL
ALBUMIN SERPL BCG-MCNC: 4.4 G/DL (ref 3.5–5.2)
ALBUMIN UR-MCNC: NEGATIVE MG/DL
ANION GAP SERPL CALCULATED.3IONS-SCNC: 12 MMOL/L (ref 7–15)
APPEARANCE UR: CLEAR
BILIRUB UR QL STRIP: NEGATIVE
BUN SERPL-MCNC: 16.5 MG/DL (ref 6–20)
CALCIUM SERPL-MCNC: 10.3 MG/DL (ref 8.8–10.4)
CHLORIDE SERPL-SCNC: 101 MMOL/L (ref 98–107)
CHOLEST SERPL-MCNC: 121 MG/DL
COLOR UR AUTO: ABNORMAL
CREAT SERPL-MCNC: 0.87 MG/DL (ref 0.51–0.95)
CREAT UR-MCNC: 56.3 MG/DL
CREAT UR-MCNC: 56.7 MG/DL
EGFRCR SERPLBLD CKD-EPI 2021: 80 ML/MIN/1.73M2
ERYTHROCYTE [DISTWIDTH] IN BLOOD BY AUTOMATED COUNT: 12.9 % (ref 10–15)
FASTING STATUS PATIENT QL REPORTED: NO
GLUCOSE SERPL-MCNC: 139 MG/DL (ref 70–99)
GLUCOSE UR STRIP-MCNC: >=1000 MG/DL
HCO3 SERPL-SCNC: 26 MMOL/L (ref 22–29)
HCT VFR BLD AUTO: 42.9 % (ref 35–47)
HDLC SERPL-MCNC: 39 MG/DL
HGB BLD-MCNC: 14.2 G/DL (ref 11.7–15.7)
HGB UR QL STRIP: NEGATIVE
KETONES UR STRIP-MCNC: NEGATIVE MG/DL
LDLC SERPL CALC-MCNC: 60 MG/DL
LEUKOCYTE ESTERASE UR QL STRIP: NEGATIVE
MCH RBC QN AUTO: 30.1 PG (ref 26.5–33)
MCHC RBC AUTO-ENTMCNC: 33.1 G/DL (ref 31.5–36.5)
MCV RBC AUTO: 91.1 FL (ref 78–100)
MICROALBUMIN UR-MCNC: 15.5 MG/L
MICROALBUMIN/CREAT UR: 27.34 MG/G CR (ref 0–25)
NITRATE UR QL: NEGATIVE
NONHDLC SERPL-MCNC: 82 MG/DL
PH UR STRIP: 5 [PH] (ref 5–7)
PHOSPHATE SERPL-MCNC: 3.8 MG/DL (ref 2.5–4.5)
PLATELET # BLD AUTO: 240 10E3/UL (ref 150–450)
POTASSIUM SERPL-SCNC: 4.9 MMOL/L (ref 3.4–5.3)
PROT/CREAT 24H UR: 0.12 MG/MG CR (ref 0–0.2)
RBC # BLD AUTO: 4.71 10E6/UL (ref 3.8–5.2)
RBC URINE: <1 /HPF
SODIUM SERPL-SCNC: 139 MMOL/L (ref 135–145)
SP GR UR STRIP: 1.02 (ref 1–1.03)
SQUAMOUS EPITHELIAL: <1 /HPF
TRIGL SERPL-MCNC: 112 MG/DL
UROBILINOGEN UR STRIP-MCNC: NORMAL MG/DL
VIT D+METAB SERPL-MCNC: 52 NG/ML (ref 20–50)
WBC # BLD AUTO: 7.95 10E3/UL (ref 4–11)
WBC URINE: 1 /HPF

## 2025-08-28 PROCEDURE — G0463 HOSPITAL OUTPT CLINIC VISIT: HCPCS | Performed by: INTERNAL MEDICINE

## 2025-08-28 PROCEDURE — 99204 OFFICE O/P NEW MOD 45 MIN: CPT | Performed by: INTERNAL MEDICINE

## 2025-08-28 PROCEDURE — 3074F SYST BP LT 130 MM HG: CPT | Performed by: INTERNAL MEDICINE

## 2025-08-28 PROCEDURE — 82306 VITAMIN D 25 HYDROXY: CPT | Performed by: INTERNAL MEDICINE

## 2025-08-28 PROCEDURE — 3078F DIAST BP <80 MM HG: CPT | Performed by: INTERNAL MEDICINE

## 2025-08-28 PROCEDURE — 1126F AMNT PAIN NOTED NONE PRSNT: CPT | Performed by: INTERNAL MEDICINE

## 2025-08-28 PROCEDURE — 82043 UR ALBUMIN QUANTITATIVE: CPT | Performed by: INTERNAL MEDICINE

## 2025-08-28 PROCEDURE — 99000 SPECIMEN HANDLING OFFICE-LAB: CPT | Performed by: PATHOLOGY

## 2025-08-28 RX ORDER — KETOTIFEN FUMARATE 0.35 MG/ML
1 SOLUTION/ DROPS OPHTHALMIC PRN
COMMUNITY

## 2025-08-28 RX ORDER — INSULIN DEGLUDEC 200 U/ML
10 INJECTION, SOLUTION SUBCUTANEOUS DAILY
COMMUNITY

## 2025-08-28 ASSESSMENT — PAIN SCALES - GENERAL: PAINLEVEL_OUTOF10: NO PAIN (0)

## 2025-09-02 ENCOUNTER — ALLIED HEALTH/NURSE VISIT (OUTPATIENT)
Dept: EDUCATION SERVICES | Facility: CLINIC | Age: 50
End: 2025-09-02
Payer: COMMERCIAL

## 2025-09-02 DIAGNOSIS — Z79.4 TYPE 2 DIABETES MELLITUS WITH STAGE 3A CHRONIC KIDNEY DISEASE, WITH LONG-TERM CURRENT USE OF INSULIN (H): Primary | ICD-10-CM

## 2025-09-02 DIAGNOSIS — N18.31 TYPE 2 DIABETES MELLITUS WITH STAGE 3A CHRONIC KIDNEY DISEASE, WITH LONG-TERM CURRENT USE OF INSULIN (H): Primary | ICD-10-CM

## 2025-09-02 DIAGNOSIS — E11.22 TYPE 2 DIABETES MELLITUS WITH STAGE 3A CHRONIC KIDNEY DISEASE, WITH LONG-TERM CURRENT USE OF INSULIN (H): Primary | ICD-10-CM

## 2025-09-02 PROCEDURE — G0108 DIAB MANAGE TRN  PER INDIV: HCPCS | Performed by: DIETITIAN, REGISTERED

## (undated) RX ORDER — LIDOCAINE HYDROCHLORIDE 10 MG/ML
INJECTION, SOLUTION EPIDURAL; INFILTRATION; INTRACAUDAL; PERINEURAL
Status: DISPENSED
Start: 2024-05-29

## (undated) RX ORDER — ACETAMINOPHEN 325 MG/1
TABLET ORAL
Status: DISPENSED
Start: 2024-05-29